# Patient Record
Sex: MALE | Race: WHITE
[De-identification: names, ages, dates, MRNs, and addresses within clinical notes are randomized per-mention and may not be internally consistent; named-entity substitution may affect disease eponyms.]

---

## 2022-02-20 ENCOUNTER — HOSPITAL ENCOUNTER (INPATIENT)
Dept: HOSPITAL 47 - EC | Age: 52
LOS: 4 days | Discharge: HOME | DRG: 158 | End: 2022-02-24
Attending: INTERNAL MEDICINE | Admitting: INTERNAL MEDICINE
Payer: COMMERCIAL

## 2022-02-20 VITALS — BODY MASS INDEX: 40 KG/M2

## 2022-02-20 DIAGNOSIS — R47.02: ICD-10-CM

## 2022-02-20 DIAGNOSIS — I10: ICD-10-CM

## 2022-02-20 DIAGNOSIS — D72.819: ICD-10-CM

## 2022-02-20 DIAGNOSIS — C02.9: ICD-10-CM

## 2022-02-20 DIAGNOSIS — T45.1X5A: ICD-10-CM
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
## 2022-02-20 DIAGNOSIS — E87.1: ICD-10-CM

## 2022-02-20 DIAGNOSIS — Z79.899: ICD-10-CM

## 2022-02-20 DIAGNOSIS — K11.7: ICD-10-CM

## 2022-02-20 DIAGNOSIS — E86.0: ICD-10-CM

## 2022-02-20 DIAGNOSIS — B37.0: ICD-10-CM

## 2022-02-20 DIAGNOSIS — K12.30: Primary | ICD-10-CM

## 2022-02-20 DIAGNOSIS — E87.6: ICD-10-CM

## 2022-02-20 DIAGNOSIS — C14.0: ICD-10-CM

## 2022-02-20 DIAGNOSIS — Z20.822: ICD-10-CM

## 2022-02-20 DIAGNOSIS — E78.5: ICD-10-CM

## 2022-02-20 LAB
ALBUMIN SERPL-MCNC: 4 G/DL (ref 3.5–5)
ALP SERPL-CCNC: 86 U/L (ref 38–126)
ALT SERPL-CCNC: 61 U/L (ref 4–49)
AMYLASE SERPL-CCNC: 79 U/L (ref 30–110)
ANION GAP SERPL CALC-SCNC: 8 MMOL/L
APTT BLD: 22.8 SEC (ref 22–30)
AST SERPL-CCNC: 34 U/L (ref 17–59)
BUN SERPL-SCNC: 23 MG/DL (ref 9–20)
CALCIUM SPEC-MCNC: 9.6 MG/DL (ref 8.4–10.2)
CELLS COUNTED: 100
CHLORIDE SERPL-SCNC: 96 MMOL/L (ref 98–107)
CO2 SERPL-SCNC: 29 MMOL/L (ref 22–30)
ERYTHROCYTE [DISTWIDTH] IN BLOOD BY AUTOMATED COUNT: 4.79 M/UL (ref 4.3–5.9)
ERYTHROCYTE [DISTWIDTH] IN BLOOD: 12.6 % (ref 11.5–15.5)
GLUCOSE SERPL-MCNC: 92 MG/DL (ref 74–99)
HCT VFR BLD AUTO: 41.8 % (ref 39–53)
HGB BLD-MCNC: 15.1 GM/DL (ref 13–17.5)
INR PPP: 1 (ref ?–1.2)
LIPASE SERPL-CCNC: 77 U/L (ref 23–300)
LYMPHOCYTES # BLD MANUAL: 0.79 K/UL (ref 1–4.8)
MCH RBC QN AUTO: 31.6 PG (ref 25–35)
MCHC RBC AUTO-ENTMCNC: 36.3 G/DL (ref 31–37)
MCV RBC AUTO: 87.3 FL (ref 80–100)
METAMYELOCYTES # BLD: 0.04 K/UL
MONOCYTES # BLD MANUAL: 0.43 K/UL (ref 0–1)
NEUTROPHILS NFR BLD MANUAL: 61 %
NEUTS SEG # BLD MANUAL: 2.3 K/UL (ref 1.3–7.7)
PLATELET # BLD AUTO: 284 K/UL (ref 150–450)
POTASSIUM SERPL-SCNC: 3.3 MMOL/L (ref 3.5–5.1)
PROT SERPL-MCNC: 7.1 G/DL (ref 6.3–8.2)
PT BLD: 10.8 SEC (ref 9–12)
SODIUM SERPL-SCNC: 133 MMOL/L (ref 137–145)
WBC # BLD AUTO: 3.6 K/UL (ref 3.8–10.6)

## 2022-02-20 PROCEDURE — 85730 THROMBOPLASTIN TIME PARTIAL: CPT

## 2022-02-20 PROCEDURE — 80048 BASIC METABOLIC PNL TOTAL CA: CPT

## 2022-02-20 PROCEDURE — 77386: CPT

## 2022-02-20 PROCEDURE — 80053 COMPREHEN METABOLIC PANEL: CPT

## 2022-02-20 PROCEDURE — 85610 PROTHROMBIN TIME: CPT

## 2022-02-20 PROCEDURE — 87635 SARS-COV-2 COVID-19 AMP PRB: CPT

## 2022-02-20 PROCEDURE — 74018 RADEX ABDOMEN 1 VIEW: CPT

## 2022-02-20 PROCEDURE — 74230 X-RAY XM SWLNG FUNCJ C+: CPT

## 2022-02-20 PROCEDURE — 96361 HYDRATE IV INFUSION ADD-ON: CPT

## 2022-02-20 PROCEDURE — 36415 COLL VENOUS BLD VENIPUNCTURE: CPT

## 2022-02-20 PROCEDURE — 99285 EMERGENCY DEPT VISIT HI MDM: CPT

## 2022-02-20 PROCEDURE — 71046 X-RAY EXAM CHEST 2 VIEWS: CPT

## 2022-02-20 PROCEDURE — 82150 ASSAY OF AMYLASE: CPT

## 2022-02-20 PROCEDURE — 85025 COMPLETE CBC W/AUTO DIFF WBC: CPT

## 2022-02-20 PROCEDURE — 70360 X-RAY EXAM OF NECK: CPT

## 2022-02-20 PROCEDURE — 83690 ASSAY OF LIPASE: CPT

## 2022-02-20 PROCEDURE — 96374 THER/PROPH/DIAG INJ IV PUSH: CPT

## 2022-02-20 RX ADMIN — MORPHINE SULFATE PRN MG: 4 INJECTION, SOLUTION INTRAMUSCULAR; INTRAVENOUS at 22:39

## 2022-02-20 RX ADMIN — CEFAZOLIN SCH: 330 INJECTION, POWDER, FOR SOLUTION INTRAMUSCULAR; INTRAVENOUS at 19:16

## 2022-02-20 NOTE — ED
General Adult HPI





- General


Chief complaint: Nausea/Vomiting/Diarrhea


Stated complaint: Dehydration, vomiting


Time Seen by Provider: 02/20/22 17:10


Source: patient, RN/MD (Case was discussed with Dr. Mcdonald who would like patient 

admitted.  He will consult.), RN notes reviewed


Mode of arrival: ambulatory


Limitations: no limitations





- History of Present Illness


Initial comments: 





Patient is a pleasant 51-year-old male presenting to the emergency department 

with dysphagia.  Patient had symptoms last week.  Patient does have history of 

tongue and throat cancer.  Patient currently is on radiation and chemotherapy.  

Symptoms have progressively worsened over the past 4-5 days.  Patient has 

limited ability to tolerate liquids.  No dyspnea.  Patient has been clear his 

throat frequently.  No abdominal pain.  No fevers.





- Related Data


                                    Allergies











Allergy/AdvReac Type Severity Reaction Status Date / Time


 


No Known Allergies Allergy   Verified 02/20/22 17:07














Review of Systems


ROS Statement: 


Those systems with pertinent positive or pertinent negative responses have been 

documented in the HPI.





ROS Other: All systems not noted in ROS Statement are negative.


Constitutional: Denies: fever


Eyes: Denies: eye pain


ENT: Reports: as per HPI


Respiratory: Reports: cough (Occasional cough).  Denies: dyspnea


Cardiovascular: Denies: chest pain


Endocrine: Denies: fatigue


Gastrointestinal: Denies: abdominal pain


Genitourinary: Denies: dysuria


Musculoskeletal: Denies: back pain


Skin: Denies: rash


Neurological: Denies: weakness





Past Medical History


Additional Past Medical History / Comment(s): chemo- 2/14/2022.  radiation- 

2-.  cx of throat and tounge stage 2.


History of Any Multi-Drug Resistant Organisms: None Reported


Additional Past Surgical History / Comment(s): Arm surgery


Past Psychological History: No Psychological Hx Reported


Smoking Status: Never smoker


Past Alcohol Use History: Unable to Obtain


Past Drug Use History: None Reported





General Exam


Limitations: no limitations


General appearance: alert, in no apparent distress


Head exam: Present: normocephalic


Eye exam: Present: normal appearance, PERRL


ENT exam: Present: normal oropharynx


Neck exam: Present: normal inspection


Respiratory exam: Present: normal lung sounds bilaterally


Cardiovascular Exam: Present: regular rate, normal rhythm


GI/Abdominal exam: Present: soft.  Absent: tenderness


Extremities exam: Present: normal inspection.  Absent: pedal edema, calf 

tenderness


Neurological exam: Present: alert


Psychiatric exam: Present: normal affect, normal mood


Skin exam: Present: normal color





Course


                                   Vital Signs











  02/20/22





  17:04


 


Temperature 96.8 F L


 


Pulse Rate 61


 


Respiratory 18





Rate 


 


Blood Pressure 123/75


 


O2 Sat by Pulse 97





Oximetry 














Medical Decision Making





- Medical Decision Making





Patient is made aware of plan.  Case was discussed with Dr. Alvarado, who will admit

covering hospital call.





Disposition


Clinical Impression: 


 Dehydration, Dysphagia





Disposition: ADMITTED AS IP TO THIS HOSP


Is patient prescribed a controlled substance at d/c from ED?: No


Referrals: 


Lakesha Diana DO [Primary Care Provider] - 1-2 days


Decision Time: 18:35

## 2022-02-20 NOTE — XR
EXAMINATION TYPE: XR KUB

 

DATE OF EXAM: 2/20/2022

 

COMPARISON: NONE

 

HISTORY: Dysphagia

 

TECHNIQUE: 2 views upright

 

FINDINGS: Bowel gas pattern is normal. There is no sign of intestinal obstruction or pneumoperitoneum
. Fecal pattern is normal. There are no pathologic calcifications. Bony structures are intact.

 

IMPRESSION: Nonacute abdomen.

## 2022-02-20 NOTE — XR
EXAMINATION TYPE: XR soft tissue neck

 

DATE OF EXAM: 2/20/2022

 

COMPARISON: NONE

 

HISTORY: Dysphagia

 

TECHNIQUE: 2 views

 

FINDINGS: Epiglottis is normal. Subglottic trachea appears normal. Prevertebral soft tissues are not 
thickened. Cervical spine appears intact. Tonsils and adenoids appear within normal limits.

 

IMPRESSION: Negative cervical soft tissue exam

## 2022-02-20 NOTE — XR
EXAMINATION TYPE: XR chest 2V

 

DATE OF EXAM: 2/20/2022

 

COMPARISON: NONE

 

HISTORY: Dysphagia

 

TECHNIQUE: 2 view

 

FINDINGS: Heart and mediastinum are normal. Lungs are clear of infiltrate. There is no heart failure.
 There is old healed fracture left clavicle. There is no pleural effusion. Bony thorax is intact.

 

IMPRESSION: No active cardiopulmonary disease. Normal heart.

## 2022-02-21 LAB
ANION GAP SERPL CALC-SCNC: 12.7 MMOL/L (ref 10–18)
BUN SERPL-SCNC: 18.6 MG/DL (ref 9–27)
BUN/CREAT SERPL: 15.5 RATIO (ref 12–20)
CALCIUM SPEC-MCNC: 9.3 MG/DL (ref 8.7–10.3)
CHLORIDE SERPL-SCNC: 96 MMOL/L (ref 96–109)
CO2 SERPL-SCNC: 29.3 MMOL/L (ref 20–27.5)
GLUCOSE SERPL-MCNC: 85 MG/DL (ref 70–110)
POTASSIUM SERPL-SCNC: 3.6 MMOL/L (ref 3.5–5.5)
SODIUM SERPL-SCNC: 138 MMOL/L (ref 135–145)

## 2022-02-21 RX ADMIN — ENOXAPARIN SODIUM SCH MG: 40 INJECTION SUBCUTANEOUS at 09:04

## 2022-02-21 RX ADMIN — CEFAZOLIN SCH MLS/HR: 330 INJECTION, POWDER, FOR SOLUTION INTRAMUSCULAR; INTRAVENOUS at 04:04

## 2022-02-21 RX ADMIN — ONDANSETRON PRN MG: 2 INJECTION INTRAMUSCULAR; INTRAVENOUS at 17:51

## 2022-02-21 RX ADMIN — ACETAMINOPHEN SCH ML: 160 SOLUTION ORAL at 21:22

## 2022-02-21 RX ADMIN — MORPHINE SULFATE PRN MG: 4 INJECTION, SOLUTION INTRAMUSCULAR; INTRAVENOUS at 04:04

## 2022-02-21 RX ADMIN — PANTOPRAZOLE SODIUM SCH MG: 40 TABLET, DELAYED RELEASE ORAL at 09:03

## 2022-02-21 RX ADMIN — ACETAMINOPHEN SCH ML: 160 SOLUTION ORAL at 16:43

## 2022-02-21 RX ADMIN — CEFAZOLIN SCH MLS/HR: 330 INJECTION, POWDER, FOR SOLUTION INTRAMUSCULAR; INTRAVENOUS at 13:21

## 2022-02-21 RX ADMIN — ONDANSETRON PRN MG: 2 INJECTION INTRAMUSCULAR; INTRAVENOUS at 13:21

## 2022-02-21 RX ADMIN — CEFAZOLIN SCH MLS/HR: 330 INJECTION, POWDER, FOR SOLUTION INTRAMUSCULAR; INTRAVENOUS at 21:21

## 2022-02-21 RX ADMIN — ACETAMINOPHEN SCH ML: 160 SOLUTION ORAL at 13:21

## 2022-02-21 RX ADMIN — ATORVASTATIN CALCIUM SCH MG: 20 TABLET, FILM COATED ORAL at 21:20

## 2022-02-21 NOTE — P.PN
Subjective


Progress Note Date: 02/21/22


Principal diagnosis: 


sore throat/nausea


Patient reports his throat has been increasingly sore of the past few days.  

Mostly when talking/swallowing.  Seems to get some good relief from magic mouth 

wash, but this wears off.  He also has been quite nauseous.  He threw up 2x 

earlier today, but has since kept his lunch down.  He is mostly drinking water 

and eating jello.  Also complains of thickened secretions.








Objective





- Vital Signs


Vital signs: 


                                   Vital Signs











Temp  98 F   02/21/22 13:00


 


Pulse  59 L  02/21/22 13:00


 


Resp  18   02/21/22 13:00


 


BP  155/84   02/21/22 13:00


 


Pulse Ox  97   02/21/22 13:00








                                 Intake & Output











 02/20/22 02/21/22 02/21/22





 18:59 06:59 18:59


 


Intake Total  1420 


 


Balance  1420 


 


Weight 133.81 kg 133.81 kg 133.81 kg


 


Intake:   


 


  Intake, IV Titration  1300 





  Amount   


 


    Sodium Chloride 0.9% 1,  1300 





    000 ml @ 130 mls/hr IV .   





    Q7H42M [facility] Rx#:[id number]   


 


  Oral  120 


 


Other:   


 


  # Voids  1 














- Constitutional


General appearance: Present: no acute distress





- EENT


Eyes: Present: EOMI, PERRLA


ENT: Present: normal oropharynx





- Neck


Neck: Present: lymphadenopathy





- Respiratory


Respiratory: bilateral: CTA





- Cardiovascular


Rhythm: regular





- Integumentary


Integumentary: Absent: calor





- Neurologic


Neurologic: Present: CNII-XII intact





- Psychiatric


Psychiatric: Present: A&O x's 3, appropriate affect





- Labs


CBC & Chem 7: 


                                 02/20/22 18:19





                                 02/21/22 05:15


Labs: 


                  Abnormal Lab Results - Last 24 Hours (Table)











  02/20/22 02/20/22 02/21/22 Range/Units





  18:19 18:19 05:15 


 


WBC  3.6 L    (3.8-10.6)  k/uL


 


Lymphocytes # (Manual)  0.79 L    (1.0-4.8)  k/uL


 


Metamyelocytes # (Man)  0.04 H    (0)  k/uL


 


Sodium   133 L   (137-145)  mmol/L


 


Potassium   3.3 L   (3.5-5.1)  mmol/L


 


Chloride   96 L   ()  mmol/L


 


Carbon Dioxide    29.3 H  (20.0-27.5)  mmol/L


 


BUN   23 H   (9-20)  mg/dL


 


ALT   61 H   (4-49)  U/L














Assessment and Plan


Assessment: 


The patient is a 51-year-old male with a history of a recently diagnosed 

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  He has been enrolled on [facility] HN-005 and randomized to Arm

2 with cisplatin + 6 weeks RT.  He has completed 4/6 weeks of radiation.  

Hospitalized due to dehydration and difficulty with PO intake due to nausea and 

mucositis.





Plan: 


1.  Poor PO intake:  Due to chemoradiation.  Patient has had discussion RE PEG 

tube - he hopes to avoid, but currently only tolerating liquids/jello.  May be 

partially due to increased nausea/vomiting from chemo the week prior.  Will re-

assess tomorrow.   Continue Magic mouth wash + narcotic pain medication.  





2.  SCCa of oropharynx:  Hold XRT today - will re-evaluate tomorrow.  10 

treatments remaining.  Did explain that even after completion of therapy that 

symptoms of treatment would linger for 2-3 weeks.  


Time with Patient: Less than 30

## 2022-02-21 NOTE — P.HPIM
History of Present Illness


H&P Date: 02/20/22


Chief Complaint: dysphagia 





51 year old male with recent diagnosis of throat cancer





patient comes in with 1 week history of worsening dysphagia to both solid and 

liquid , symptoms got worse over the past 4-5days , patient unable to take PO 

due to dysphagia and odynophagia 





he was recently diagnosed with throat cancer, and currently receiving radiation 

therapy  for the past 4 weeks. 





denies any fever, chills, bleeding from his mouth, denies any URI symptoms , 

denies headache, or dental issues. denies any history of GERD. 





he denies any similar symptoms in the past. 





workup in the ED showed, hypokalemia and COVID negative, CXR no acute pathology 





patient denies tobacco smoking, and admits to social alcohol intake , denies any

thing daily or excessive, denies any drugs





Review of Systems





 











Pertinent positives as noted in HPI. All other systems were reviewed and are 

negative 





Past Medical History


Additional Past Medical History / Comment(s): chemo- 2/14/2022.  radiation- 

2-.  cx of throat and tounge stage 2.


History of Any Multi-Drug Resistant Organisms: None Reported


Additional Past Surgical History / Comment(s): Arm surgery


Past Psychological History: No Psychological Hx Reported


Smoking Status: Never smoker


Past Alcohol Use History: Unable to Obtain


Past Drug Use History: None Reported





- Past Family History


  ** famioy 


Family Medical History: No Reported History





Medications and Allergies


                                Home Medications











 Medication  Instructions  Recorded  Confirmed  Type


 


Atorvastatin [Lipitor] 20 mg PO HS 02/20/22 02/20/22 History


 


Hydrocodone/Acetaminophen 15 ml PO Q8H PRN 02/20/22 02/20/22 History





[Hydrocodone/Acetaminophen    





7.5-325/15 Ml]    


 


Omeprazole 40 mg PO DAILY 02/20/22 02/20/22 History


 


hydroCHLOROthiazide 25 mg PO DAILY 02/20/22 02/20/22 History








                                    Allergies











Allergy/AdvReac Type Severity Reaction Status Date / Time


 


No Known Allergies Allergy   Verified 02/20/22 19:41














Physical Exam


Vitals: 


                                   Vital Signs











  Temp Pulse Pulse Resp BP BP Pulse Ox


 


 02/20/22 22:14  98.5 F   71  16   136/76  95


 


 02/20/22 17:04  96.8 F L  61   18  123/75   97








                                Intake and Output











 02/20/22 02/20/22 02/21/22





 14:59 22:59 06:59


 


Other:   


 


  Weight  133.81 kg 














Constitutional:          No acute distress, conversant, pleasant


Eyes:      Anicteric sclerae, moist conjunctiva, 


         Pupils equal round reactive to light





ENMT:      NC/AT


         Oropharynx showing sloughing and yellowish exudate over soft palate and

oropharynx with erythema





Neck:      Supple,  palpable LN submandibular and anterior cervical over left 

side, or JVD


         No carotid bruits


         No thyromegaly





Lungs:      Clear to auscultation


         Clear to percussion


         Normal respiratory effort, no accessory muscle use 





Cardiovascular:      Heart regular in rate and rhythm, 


         No murmurs, gallops, or rubs


         No peripheral edema





Abdominal:       Soft


         Nontender, no guarding, rebound or rigidity


         Abdomen moving with respiration


         Normoactive bowel sounds


         No hepatomegaly, No splenomegaly


         No palpable mass 


         No abdominal wall hernia noted 





Skin:      Normal temperature, tone, texture, turgor


         No induration


         No subcutaneous nodules 


         No rash, lesions


         No ulcers





Extremities:      No digital cyanosis 


         No clubbing


         Pedal pulses intact and symmetrical


         Radial pulses intact and symmetrical 


         No calf tenderness 





Psychiatric:      Alert and oriented to person, place and time


         Appropriate affect


         fair judgement   


      


Neuro      Muscles Strength 5/5 in all 4 extremities 


         Sensation to light touch grossly present throughout


         Cranial nerves II-XII grossly intact


         No focal sensory deficits


Lymphatics:       palpable left submandibular and anterior cervical  lymph nodes

 





Results


CBC & Chem 7: 


                                 02/20/22 18:19





                                 02/20/22 18:19


Labs: 


                  Abnormal Lab Results - Last 24 Hours (Table)











  02/20/22 02/20/22 Range/Units





  18:19 18:19 


 


WBC  3.6 L   (3.8-10.6)  k/uL


 


Lymphocytes # (Manual)  0.79 L   (1.0-4.8)  k/uL


 


Metamyelocytes # (Man)  0.04 H   (0)  k/uL


 


Sodium   133 L  (137-145)  mmol/L


 


Potassium   3.3 L  (3.5-5.1)  mmol/L


 


Chloride   96 L  ()  mmol/L


 


BUN   23 H  (9-20)  mg/dL


 


ALT   61 H  (4-49)  U/L














Assessment and Plan


Assessment: 





acute mucositis of the oropharynx  (Radiation induced oral mucositis)


throat cancer





plan 


artificial saliva


mouth wash


nystatin 


supportive care


pain control with opiates


oncology consult 


IVF hydration with normal saline 





hypokalemia , replace and follow up levels 





full code


lovenox for dvt ppx


anticipated length of stay < 2 midnis

## 2022-02-21 NOTE — P.PN
<Gunner Delgadillo - Last Filed: 02/21/22 15:37>





Subjective


Progress Note Date: 02/21/22





Hospital course:





Patient is a very pleasant 51-year-old male recently diagnosed with throat 

cancer (squamous cell carcinoma ) and began chemotherapy and radiation 4 weeks 

ago.  Patient reports follows with oncologist Dr. Patel in office and Dr. Soto for radiation/chemotherapy treatments.  He presented to the emergency 

department 2/20/22 with a chief complaint of worsening dysphasia patient reports

this began approximately 1 weeks ago after undergoing his second course of 

chemotherapy on 2/14/22 and has progressively worsened.  Patient reports last 

radiation treatment on 2/20/22 and next dose due today.  In the emergency 

department, patient underwent x-ray soft tissue of neck which was negative for 

cervical soft tissue abnormalities.  Chest x-ray was negative for acute 

cardiopulmonary process   An KUB was also negative for acute process.  Patient 

was found to have leukopenia with WBC count of 3.6, hyponatremia with sodium of 

133, and hypokalemia with potassium of 3.3.  Labs otherwise showing no sig

nificant abnormalities.  Covid PCR was negative.





Physical exam:





Vital signs reviewed and stable. 


General: Nontoxic, no distress and appears stated age.  


Derm: Skin warm and dry, normal coloration for ethnicity.


Head: Atraumatic, normocephalic and symmetric.  


Eyes: EOMs intact, no lid lag, and anicteric sclera


Mouth: no lip lesions, mucus membranes dry


Cardiovascular: regular rate and rhythm with normal S1S2, no murmur, positive 

posterior tibial pulses bilaterally, and cap refill < 2 seconds.  


Lungs: Respirations even, regular, and unlabored on room air. Lungs CTA 

bilaterally, no rhonchi, no rales, no wheezing, and no accessory muscle usage. 


Abdominal: soft, nontender to palpation, no guarding, no appreciable 

organomegaly


Ext: ROM intact. No gross muscle atrophy, no edema, no contractures


Neuro: Speech clear, face symmetrical and CN II-XII grossly intact with no noted

focal neuro deficits


Psych: Alert and oriented to person, place, time, and situation. Appropriate and

pleasant affect. 





Assessment and Plan of Care:





Acute mucositis, radiation-induced oral mucositis


Squamous cell carcinoma of the throat


Dysphasia


Xerostomia


-Continue with artificial saliva


-Magic mouthwash


-Nystatin


-Symptomatic and supportive measures


-Pain control with opiates


-Oncology consult


-Continue hydration with IV fluids.





Leukopenia, secondary to current anti-neoplastic medications/treatment


-We will provide neutropenic precautions for patient safety and continue to 

monitor with repeat a.m. labs.





Hypokalemia, resolved





Hyponatremia, resolved











CODE STATUS: Full code


DVT prophylaxis: Lovenox


Discussed with: Patient and RN


Anticipated discharge date: Clinical course to determine


Anticipated discharge place: Home


A total of 35 minutes was spent on the care of this complex patient more than 

50% of the time was spent in counseling and care coordination.











Objective





- Vital Signs


Vital signs: 


                                   Vital Signs











Temp  97.7 F   02/21/22 04:32


 


Pulse  87   02/21/22 04:32


 


Resp  16   02/21/22 04:32


 


BP  120/70   02/21/22 04:32


 


Pulse Ox  97   02/21/22 04:32








                                 Intake & Output











 02/20/22 02/21/22 02/21/22





 18:59 06:59 18:59


 


Intake Total  1420 


 


Balance  1420 


 


Weight 133.81 kg 133.81 kg 


 


Intake:   


 


  Intake, IV Titration  1300 





  Amount   


 


    Sodium Chloride 0.9% 1,  1300 





    000 ml @ 130 mls/hr IV .   





    Q7H42M [facility] Rx#:[id number]   


 


  Oral  120 


 


Other:   


 


  # Voids  1 














- Labs


CBC & Chem 7: 


                                 02/20/22 18:19





                                 02/21/22 05:15


Labs: 


                  Abnormal Lab Results - Last 24 Hours (Table)











  02/20/22 02/20/22 Range/Units





  18:19 18:19 


 


WBC  3.6 L   (3.8-10.6)  k/uL


 


Lymphocytes # (Manual)  0.79 L   (1.0-4.8)  k/uL


 


Metamyelocytes # (Man)  0.04 H   (0)  k/uL


 


Sodium   133 L  (137-145)  mmol/L


 


Potassium   3.3 L  (3.5-5.1)  mmol/L


 


Chloride   96 L  ()  mmol/L


 


BUN   23 H  (9-20)  mg/dL


 


ALT   61 H  (4-49)  U/L














<Suma Padgett - Last Filed: 02/21/22 16:32>





Subjective





I reviewed the documentation as provided by the RAFAEL above, who is the original 

author of this note.  I agree with the documented assessment and plan, with the 

following changes: None





Objective





- Vital Signs


Vital signs: 


                                   Vital Signs











Temp  98 F   02/21/22 13:00


 


Pulse  59 L  02/21/22 13:00


 


Resp  18   02/21/22 13:00


 


BP  155/84   02/21/22 13:00


 


Pulse Ox  97   02/21/22 13:00








                                 Intake & Output











 02/20/22 02/21/22 02/21/22





 18:59 06:59 18:59


 


Intake Total  1420 


 


Balance  1420 


 


Weight 133.81 kg 133.81 kg 133.81 kg


 


Intake:   


 


  Intake, IV Titration  1300 





  Amount   


 


    Sodium Chloride 0.9% 1,  1300 





    000 ml @ 130 mls/hr IV .   





    Q7H42M [facility] Rx#:[id number]   


 


  Oral  120 


 


Other:   


 


  # Voids  1 














- Labs


CBC & Chem 7: 


                                 02/20/22 18:19





                                 02/21/22 05:15


Labs: 


                  Abnormal Lab Results - Last 24 Hours (Table)











  02/20/22 02/20/22 02/21/22 Range/Units





  18:19 18:19 05:15 


 


WBC  3.6 L    (3.8-10.6)  k/uL


 


Lymphocytes # (Manual)  0.79 L    (1.0-4.8)  k/uL


 


Metamyelocytes # (Man)  0.04 H    (0)  k/uL


 


Sodium   133 L   (137-145)  mmol/L


 


Potassium   3.3 L   (3.5-5.1)  mmol/L


 


Chloride   96 L   ()  mmol/L


 


Carbon Dioxide    29.3 H  (20.0-27.5)  mmol/L


 


BUN   23 H   (9-20)  mg/dL


 


ALT   61 H   (4-49)  U/L

## 2022-02-21 NOTE — P.CONS
History of Present Illness





- Reason for Consult


Consult date: 02/21/22


completed treatment for head/neck


Requesting physician: Adalberto Ware





- Chief Complaint


dyphagia





- History of Present Illness





Mr. Menjivar is a very pleasant 51-year-old male patient of Dr. Patel who 

presented with a painless left mid cervical lesion persistent for about 2-3 

months, progressively enlarging.  He had a CT scan done showing 2 enlarged 

cervical lymph nodes, the largest was 3.3 cm.  FNA 10/12/21 was positive for 

squamous cell carcinoma, P 16 positive.  Staging PET scan 11/9/21 showed uptake 

in the 2 known cervical lymph nodes and left base of the tongue, which was felt 

to represent the primary tumor site.  No other metastatic lesions, no 

contralateral lymphadenopathy.  Patient was evaluated by Dr. Villalobos, 

recommendation was for neoadjuvant concurrent chemoradiation.  Patient is status

post 2 cycles of cisplatin, 2/14/22.  He has 2 more weeks of radiation to 

complete.  He has had mild symptoms up until this past weekend.


His wife contacted the service, patient has been having difficulty maintaining 

oral intake secondary to dysphagia and odynophagia, he has also been 

experiencing nausea and vomiting, moderate dry mucous membranes.  On admission h

e was found to have hyponatremia, hypokalemia.  He did have an episode of 

vomiting this a.m.  No recent fevers, new or unusual cough, suspicions for 

aspiration, chest pain, abdominal pain or cramping, acute changes in bowel or 

bladder habits.  The pain he is experiencing is limited to the oral cavity and 

swallowing





Review of Systems





10 point ROS is neg except as stated in HPI





Past Medical History


Past Medical History: Cancer, Hyperlipidemia, Hypertension


Additional Past Medical History / Comment(s): chemo- 2/14/2022.  radiation- 

2-.  cx of throat and tounge stage 2.


History of Any Multi-Drug Resistant Organisms: None Reported


Additional Past Surgical History / Comment(s): Arm surgery, has had colonoscopy


Additional Past Anesthesia/Blood Transfusion Reaction / Comm: No hx of 

transfusion at this time


Past Psychological History: No Psychological Hx Reported


Smoking Status: Never smoker


Past Alcohol Use History: Unable to Obtain


Past Drug Use History: None Reported





- Past Family History


  ** famioy 


Family Medical History: No Reported History





Medications and Allergies


                                Home Medications











 Medication  Instructions  Recorded  Confirmed  Type


 


Atorvastatin [Lipitor] 20 mg PO HS 02/20/22 02/20/22 History


 


Hydrocodone/Acetaminophen 15 ml PO Q8H PRN 02/20/22 02/20/22 History





[Hydrocodone/Acetaminophen    





7.5-325/15 Ml]    


 


Omeprazole 40 mg PO DAILY 02/20/22 02/20/22 History


 


hydroCHLOROthiazide 25 mg PO DAILY 02/20/22 02/20/22 History








                                    Allergies











Allergy/AdvReac Type Severity Reaction Status Date / Time


 


No Known Allergies Allergy   Verified 02/20/22 19:41














Physical Exam


Vitals: 


                                   Vital Signs











  Temp Pulse Pulse Resp BP BP Pulse Ox


 


 02/21/22 04:32  97.7 F   87  16   120/70  97


 


 02/20/22 22:14  98.5 F   71  16   136/76  95


 


 02/20/22 17:04  96.8 F L  61   18  123/75   97








                                Intake and Output











 02/20/22 02/21/22 02/21/22





 22:59 06:59 14:59


 


Intake Total  1420 


 


Balance  1420 


 


Intake:   


 


  Intake, IV Titration  1300 





  Amount   


 


    Sodium Chloride 0.9% 1,  1300 





    000 ml @ 130 mls/hr IV .   





    Q7H42M [facility] Rx#:[id number]   


 


  Oral  120 


 


Other:   


 


  # Voids  1 


 


  Weight 133.81 kg  














- Constitutional


General appearance: average body habitus, cooperative, no acute distress





- EENT





dry mucus membranes


Eyes: anicteric sclerae, EOMI


ENT: hearing grossly normal





- Neck


Neck: no lymphadenopathy





- Respiratory


Respiratory: bilateral: CTA





- Cardiovascular


Rhythm: regular


Heart sounds: normal: S1, S2


Abnormal Heart Sounds: no systolic murmur, no diastolic murmur, no rub, no S3 

Gallop, no S4 Gallop, no click, no other


  ** leg


Peripheral Edema: bilateral: None





- Gastrointestinal


General gastrointestinal: no absent bowel sounds, no decreased bowel sounds, no 

distended, no hepatomegaly, no hyperactive bowel sounds, normal bowel sounds, no

organomegaly, no rigid, no scaphoid, soft, no splenomegaly, no tenderness, no 

umbilical hernia, no ventral hernia





- Integumentary


Integumentary: normal





- Neurologic


Neurologic: CNII-XII intact





- Musculoskeletal


Musculoskeletal: strength equal bilaterally





- Psychiatric


Psychiatric: A&O x's 3, appropriate affect, intact judgment & insight





Results


CBC & Chem 7: 


                                 02/20/22 18:19





                                 02/21/22 05:15


Labs: 


                  Abnormal Lab Results - Last 24 Hours (Table)











  02/20/22 02/20/22 Range/Units





  18:19 18:19 


 


WBC  3.6 L   (3.8-10.6)  k/uL


 


Lymphocytes # (Manual)  0.79 L   (1.0-4.8)  k/uL


 


Metamyelocytes # (Man)  0.04 H   (0)  k/uL


 


Sodium   133 L  (137-145)  mmol/L


 


Potassium   3.3 L  (3.5-5.1)  mmol/L


 


Chloride   96 L  ()  mmol/L


 


BUN   23 H  (9-20)  mg/dL


 


ALT   61 H  (4-49)  U/L











Comments: 





Xray neck report reviewed


Chest x-ray: report reviewed


Abdominal x-ray: report reviewed





Assessment and Plan


(1) Dehydration


Narrative/Plan: 


IV fluids and electrolyte replacements


Current Visit: Yes   Status: Acute   Priority: High   Code(s): E86.0 - 

DEHYDRATION   SNOMED Code(s): 45956197


   





(2) Dysphagia


Narrative/Plan: 


Kools solution with nystatin ordered 5xDay


Dietitian to assess pt and provide options for dysphagia/odynophagia


Current Visit: Yes   Status: Acute   Priority: High   Code(s): R13.10 - 

DYSPHAGIA, UNSPECIFIED   SNOMED Code(s): 19048256


   





(3) Squamous cell cancer of tongue


Narrative/Plan: 


Patient has completed 2 cycles of cisplatin, last dose was 7 days ago.  Vomiting

and electrolyte derangements are likely secondary to the same.  Supportive 

medications, hydration are all ordered.





Discussed the case with Radiation Oncologist.  Plan is to hold treatment for 

today.  Radiation Oncologist will see patient and determine when resumption of 

therapy is most appropriate.





Dr. Mcdonald did discuss with the patient the possibility of palliative PEG tube for 

nutrition and hydration until completion of treatment.  For the next 1-2 days 

would like to try to treat the side effects of chemotherapy and ease symptoms.  

Have also asked the dietitian to see the patient to educate them and provide 

accommodations for clear and full liquid high calorie/protein options.  Will 

reassess the pt and decide with patient if he thinks he will be able to manage 

for 2 more weeks of radiation as well as an additional 2 weeks of recovery time-

total 4 weeks.  Will f/u.   


Current Visit: Yes   Status: Acute   Priority: High   Code(s): C02.9 - MALIGNANT

NEOPLASM OF TONGUE, UNSPECIFIED   SNOMED Code(s): 008029286


   





(4) Vomiting


Narrative/Plan: 


Olanzapine and zofran ordered.  


Current Visit: Yes   Status: Acute   Priority: High   Code(s): R11.10 - 

VOMITING, UNSPECIFIED   SNOMED Code(s): 859439005


   


Plan: 





Doctor attests:


I performed a history and physical examination of this patient, developed 

impression and plan of care, discussed with dictator.  I agree with dictators 

note, documented as a scribe.

## 2022-02-22 RX ADMIN — ACETAMINOPHEN SCH ML: 160 SOLUTION ORAL at 19:57

## 2022-02-22 RX ADMIN — ENOXAPARIN SODIUM SCH MG: 40 INJECTION SUBCUTANEOUS at 11:52

## 2022-02-22 RX ADMIN — ONDANSETRON PRN MG: 2 INJECTION INTRAMUSCULAR; INTRAVENOUS at 11:01

## 2022-02-22 RX ADMIN — ACETAMINOPHEN SCH: 160 SOLUTION ORAL at 15:52

## 2022-02-22 RX ADMIN — CEFAZOLIN SCH MLS/HR: 330 INJECTION, POWDER, FOR SOLUTION INTRAMUSCULAR; INTRAVENOUS at 19:56

## 2022-02-22 RX ADMIN — ACETAMINOPHEN SCH: 160 SOLUTION ORAL at 00:22

## 2022-02-22 RX ADMIN — CEFAZOLIN SCH MLS/HR: 330 INJECTION, POWDER, FOR SOLUTION INTRAMUSCULAR; INTRAVENOUS at 23:52

## 2022-02-22 RX ADMIN — PANTOPRAZOLE SODIUM SCH MG: 40 TABLET, DELAYED RELEASE ORAL at 11:53

## 2022-02-22 RX ADMIN — CEFAZOLIN SCH: 330 INJECTION, POWDER, FOR SOLUTION INTRAMUSCULAR; INTRAVENOUS at 20:00

## 2022-02-22 RX ADMIN — ACETAMINOPHEN SCH ML: 160 SOLUTION ORAL at 23:52

## 2022-02-22 RX ADMIN — CEFAZOLIN SCH: 330 INJECTION, POWDER, FOR SOLUTION INTRAMUSCULAR; INTRAVENOUS at 06:20

## 2022-02-22 RX ADMIN — ACETAMINOPHEN SCH: 160 SOLUTION ORAL at 06:20

## 2022-02-22 RX ADMIN — ACETAMINOPHEN SCH ML: 160 SOLUTION ORAL at 11:01

## 2022-02-22 RX ADMIN — ATORVASTATIN CALCIUM SCH MG: 20 TABLET, FILM COATED ORAL at 19:56

## 2022-02-22 NOTE — P.PN
<Gunner Delgadillo - Last Filed: 02/22/22 15:30>





Subjective


Progress Note Date: 02/22/22





Hospital course:





Patient is a very pleasant 51-year-old male recently diagnosed with throat can

cer (squamous cell carcinoma ) and began chemotherapy and radiation 4 weeks ago.

 Patient reports follows with oncologist Dr. Patel in office and Dr. Soto 

for radiation/chemotherapy treatments.  He presented to the emergency department

2/20/22 with a chief complaint of worsening dysphasia patient reports this began

approximately 1 weeks ago after undergoing his second course of chemotherapy on 

2/14/22 and has progressively worsened.  Patient reports last radiation 

treatment on 2/20/22 and next dose due today.  In the emergency department, 

patient underwent x-ray soft tissue of neck which was negative for cervical soft

tissue abnormalities.  Chest x-ray was negative for acute cardiopulmonary 

process   An KUB was also negative for acute process.  Patient was found to have

leukopenia with WBC count of 3.6, hyponatremia with sodium of 133, and 

hypokalemia with potassium of 3.3.  Labs otherwise showing no significant 

abnormalities.  Covid PCR was negative.





Physical exam:





Patient seen and fully evaluated at the bedside this morning.  Patient going 

down for barium swallow evaluation.  He reports continued difficulty swallowing 

and continued dry mouth.  He denies having any chest pain, palpitations, 

shortness of breath, or experiencing any numbness/tingling/weakness in his 

extremities.  Dr. Soto has arranged for patient to continue radiation therapy

this afternoon.





Vital signs reviewed and stable. 


General: Nontoxic, no distress and appears stated age.  


Derm: Skin warm and dry, normal coloration for ethnicity.


Head: Atraumatic, normocephalic and symmetric.  


Eyes: EOMs intact, no lid lag, and anicteric sclera


Mouth: no lip lesions, mucus membranes dry


Cardiovascular: regular rate and rhythm with normal S1S2, no murmur, positive 

posterior tibial pulses bilaterally, and cap refill < 2 seconds.  


Lungs: Respirations even, regular, and unlabored on room air. Lungs CTA 

bilaterally, no rhonchi, no rales, no wheezing, and no accessory muscle usage. 


Abdominal: soft, nontender to palpation, no guarding, no appreciable organom

egaly


Ext: ROM intact. No gross muscle atrophy, no edema, no contractures


Neuro: Speech clear, face symmetrical and CN II-XII grossly intact with no noted

focal neuro deficits


Psych: Alert and oriented to person, place, time, and situation. Appropriate and

pleasant affect. 





Assessment and Plan of Care:





Acute mucositis, radiation-induced oral mucositis


Squamous cell carcinoma of the throat


Dysphasia


Xerostomia


-Continue with artificial saliva


-Magic mouthwash


-Nystatin


-Symptomatic and supportive measures


-Pain control with opiates


-Oncology following appreciate further recommendations


-Radiation oncologist following, Dr. Soto and he has arranged for continued 

radiation treatments.


-Continue hydration with IV fluids.





Leukopenia, secondary to current anti-neoplastic medications/treatment


-We will provide neutropenic precautions for patient safety and continue to 

monitor with repeat a.m. labs.





Hypokalemia, resolved





Hyponatremia, resolved








CODE STATUS: Full code


DVT prophylaxis: Lovenox


Discussed with: Patient and RN


Anticipated discharge date: Clinical course to determine


Anticipated discharge place: Home


A total of 35 minutes was spent on the care of this complex patient more than 

50% of the time was spent in counseling and care coordination.











Objective





- Vital Signs


Vital signs: 


                                   Vital Signs











Temp  98.1 F   02/22/22 04:53


 


Pulse  106 H  02/22/22 04:53


 


Resp  18   02/22/22 04:53


 


BP  153/92   02/22/22 04:53


 


Pulse Ox  95   02/22/22 04:53








                                 Intake & Output











 02/21/22 02/22/22 02/22/22





 18:59 06:59 18:59


 


Intake Total 1800  


 


Output Total  400 


 


Balance 1800 -400 


 


Weight 133.81 kg  


 


Intake:   


 


  Intake, IV Titration 1400  





  Amount   


 


    Sodium Chloride 0.9% 1, 1400  





    000 ml @ 130 mls/hr IV .   





    Q7H42M [facility] Rx#:[id number]   


 


  Oral 400  


 


Output:   


 


  Urine  400 


 


Other:   


 


  # Voids 3  














- Labs


CBC & Chem 7: 


                                 02/20/22 18:19





                                 02/21/22 05:15


Labs: 


                  Abnormal Lab Results - Last 24 Hours (Table)











  02/21/22 Range/Units





  05:15 


 


Carbon Dioxide  29.3 H  (20.0-27.5)  mmol/L














<Suma Padgett - Last Filed: 02/23/22 16:24>





Subjective











I reviewed the documentation as provided by the RAFAEL above, who is the original a

uthor of this note.  I agree with the documented assessment and plan, with the 

following changes: None





Objective





- Vital Signs


Vital signs: 


                                   Vital Signs











Temp  98.0 F   02/23/22 11:41


 


Pulse  63   02/23/22 11:41


 


Resp  20   02/23/22 11:41


 


BP  121/80   02/23/22 11:41


 


Pulse Ox  98   02/23/22 11:41








                                 Intake & Output











 02/22/22 02/23/22 02/23/22





 18:59 06:59 18:59


 


Intake Total 1560 1440 360


 


Balance 1560 1440 360


 


Intake:   


 


  Intake, IV Titration 1560 1040 





  Amount   


 


    Sodium Chloride 0.9% 1, 1560 1040 





    000 ml @ 130 mls/hr IV .   





    Q7H42M [facility] Rx#:[id number]   


 


  Oral  400 360


 


Other:   


 


  Voiding Method Toilet Toilet Toilet


 


  # Voids  2 2














- Labs


CBC & Chem 7: 


                                 02/20/22 18:19





                                 02/21/22 05:15

## 2022-02-22 NOTE — P.PN
Subjective


Progress Note Date: 02/22/22


Principal diagnosis: 





dehydration, dysphagia





in follow-up today patient is just returned from his swallowing eval, pending 

results.  He is tolerating Jell-O this morning, no vomiting.  He states that the

supportive medications are helping with the pain in the throat.





Objective





- Vital Signs


Vital signs: 


                                   Vital Signs











Temp  98.1 F   02/22/22 11:50


 


Pulse  77   02/22/22 11:50


 


Resp  18   02/22/22 11:50


 


BP  118/72   02/22/22 11:50


 


Pulse Ox  98   02/22/22 11:50








                                 Intake & Output











 02/21/22 02/22/22 02/22/22





 18:59 06:59 18:59


 


Intake Total 1800  


 


Output Total  400 


 


Balance 1800 -400 


 


Weight 133.81 kg  


 


Intake:   


 


  Intake, IV Titration 1400  





  Amount   


 


    Sodium Chloride 0.9% 1, 1400  





    000 ml @ 130 mls/hr IV .   





    Q7H42M [facility] Rx#:[id number]   


 


  Oral 400  


 


Output:   


 


  Urine  400 


 


Other:   


 


  # Voids 3  














- Constitutional


General appearance: Present: average body habitus, cooperative, no acute 

distress





- EENT


Eyes: Present: anicteric sclerae, EOMI


ENT: Present: hearing grossly normal, normal oropharynx





- Respiratory


Respiratory: bilateral: CTA





- Cardiovascular


Rhythm: regular


Heart sounds: normal: S1, S2


Abnormal Heart Sounds: Absent: systolic murmur, diastolic murmur, rub, S3 

Gallop, S4 Gallop, click, other





- Peripheral edema


  ** leg


Peripheral Edema: bilateral: None





- Gastrointestinal


General gastrointestinal: Present: normal bowel sounds, soft





- Neurologic


Neurologic: Present: CNII-XII intact





- Musculoskeletal


Musculoskeletal: Present: strength equal bilaterally





- Psychiatric


Psychiatric: Present: A&O x's 3, appropriate affect, intact judgment & insight





- Labs


CBC & Chem 7: 


                                 02/20/22 18:19





                                 02/21/22 05:15





Assessment and Plan


(1) Dehydration


Narrative/Plan: 


IV fluids and electrolyte replacements


Current Visit: Yes   Status: Acute   Priority: High   Code(s): E86.0 - 

DEHYDRATION   SNOMED Code(s): 02586655


   





(2) Dysphagia


Narrative/Plan: 


Kools solution with nystatin ordered 5xDay


Dietitian to assess pt and provide options for dysphagia/odynophagia


Current Visit: Yes   Status: Acute   Priority: High   Code(s): R13.10 - 

DYSPHAGIA, UNSPECIFIED   SNOMED Code(s): 50987008


   





(3) Squamous cell cancer of tongue


Narrative/Plan: 


Patient has completed 2/2 cycles of cisplatin, last dose was 7 days ago.  

Vomiting and electrolyte derangements are likely secondary to the same.  

Supportive medications, hydration are all ordered.  Labs improved today





Radiation Oncologistto f/u today with pt and determine when resumption of 

therapy is most appropriate.





Did review case with Speech therapist, pending swallow eval and recs.  If pt is 

aspirating then he will need PEG placement.  If he is is not, then cont 

supportive meds.  Will plan for hydration in the outpt setting for the next 4 

weeks.  


Pt dysphagia and odynophagia are improved today.   He did tolerate jello.  


Dietitian has seen pt.  


Current Visit: Yes   Status: Acute   Priority: High   Code(s): C02.9 - MALIGNANT

NEOPLASM OF TONGUE, UNSPECIFIED   SNOMED Code(s): 234665545


   





(4) Vomiting


Narrative/Plan: 


Olanzapine and zofran ordered.  No vomiting today


Current Visit: Yes   Status: Acute   Priority: High   Code(s): R11.10 - 

VOMITING, UNSPECIFIED   SNOMED Code(s): 848910440


   


Plan: 





Doctor attests:


I performed a history and physical examination of this patient, developed 

impression and plan of care, discussed with dictator.  I agree with dictators 

note, documented as a scribe.

## 2022-02-22 NOTE — FL
EXAMINATION TYPE: FL barium swallow w video

 

DATE OF EXAM: 2/22/2022

 

MODIFIED SWALLOW / DEGLUTITION STUDY

 

CLINICAL HISTORY: Rule out aspiration

 

TECHNIQUE:  Deglutition study is performed utilizing thin liquid barium, honey and nectar thick liqui
d barium, barium thick applesauce, and barium coated cracker.

 

COMPARISON: None.

 

FINDINGS: There is no evidence of penetration or aspiration with any modality tested. Fluoroscopic ti
me of 1 minute and 3 seconds. No images provided.

 

IMPRESSION: No laryngeal penetration or aspiration at the time of the study.  Please refer to speech 
therapist notes for further details.

## 2022-02-23 RX ADMIN — ENOXAPARIN SODIUM SCH MG: 40 INJECTION SUBCUTANEOUS at 09:23

## 2022-02-23 RX ADMIN — MORPHINE SULFATE PRN MG: 4 INJECTION, SOLUTION INTRAMUSCULAR; INTRAVENOUS at 09:22

## 2022-02-23 RX ADMIN — ACETAMINOPHEN SCH ML: 160 SOLUTION ORAL at 09:29

## 2022-02-23 RX ADMIN — ACETAMINOPHEN SCH ML: 160 SOLUTION ORAL at 04:20

## 2022-02-23 RX ADMIN — ACETAMINOPHEN SCH ML: 160 SOLUTION ORAL at 17:19

## 2022-02-23 RX ADMIN — CEFAZOLIN SCH MLS/HR: 330 INJECTION, POWDER, FOR SOLUTION INTRAMUSCULAR; INTRAVENOUS at 09:32

## 2022-02-23 RX ADMIN — ACETAMINOPHEN SCH ML: 160 SOLUTION ORAL at 19:32

## 2022-02-23 RX ADMIN — CEFAZOLIN SCH MLS/HR: 330 INJECTION, POWDER, FOR SOLUTION INTRAMUSCULAR; INTRAVENOUS at 04:21

## 2022-02-23 RX ADMIN — PANTOPRAZOLE SODIUM SCH MG: 40 TABLET, DELAYED RELEASE ORAL at 09:23

## 2022-02-23 RX ADMIN — ONDANSETRON PRN MG: 2 INJECTION INTRAMUSCULAR; INTRAVENOUS at 11:46

## 2022-02-23 RX ADMIN — ATORVASTATIN CALCIUM SCH MG: 20 TABLET, FILM COATED ORAL at 19:31

## 2022-02-23 RX ADMIN — CEFAZOLIN SCH MLS/HR: 330 INJECTION, POWDER, FOR SOLUTION INTRAMUSCULAR; INTRAVENOUS at 17:20

## 2022-02-23 NOTE — P.PN
<Gunner Delgadillo - Last Filed: 02/23/22 12:14>





Subjective


Progress Note Date: 02/23/22





Hospital course:





Patient is a very pleasant 51-year-old male recently diagnosed with throat can

cer (squamous cell carcinoma ) and began chemotherapy and radiation 4 weeks ago.

 Patient reports follows with oncologist Dr. Patel in office and Dr. Soto 

for radiation/chemotherapy treatments.  He presented to the emergency department

2/20/22 with a chief complaint of worsening dysphasia patient reports this began

approximately 1 weeks ago after undergoing his second course of chemotherapy on 

2/14/22 and has progressively worsened.  Patient reports last radiation 

treatment on 2/20/22 and next dose due today.  In the emergency department, 

patient underwent x-ray soft tissue of neck which was negative for cervical soft

tissue abnormalities.  Chest x-ray was negative for acute cardiopulmonary 

process   An KUB was also negative for acute process.  Patient was found to have

leukopenia with WBC count of 3.6, hyponatremia with sodium of 133, and 

hypokalemia with potassium of 3.3.  Labs otherwise showing no significant 

abnormalities.  Covid PCR was negative.





Physical exam:





Patient seen and fully evaluated at the bedside this morning.  Patient doing 

well this morning.  He has been tolerating oral intake and diet being increased 

to full liquids.  Patient just returned from radiation therapy.  He continues to

deny having any headache, lightheadedness, dizziness, chest pain, palpitations, 

or shortness of breath.  





Vital signs reviewed and stable. 


General: Nontoxic, no distress and appears stated age.  


Derm: Skin warm and dry, normal coloration for ethnicity.


Head: Atraumatic, normocephalic and symmetric.  


Eyes: EOMs intact, no lid lag, and anicteric sclera


Mouth: no lip lesions, mucus membranes dry


Cardiovascular: regular rate and rhythm with normal S1S2, no murmur, positive 

posterior tibial pulses bilaterally, and cap refill < 2 seconds.  


Lungs: Respirations even, regular, and unlabored on room air. Lungs CTA 

bilaterally, no rhonchi, no rales, no wheezing, and no accessory muscle usage. 


Abdominal: soft, nontender to palpation, no guarding, no appreciable 

organomegaly


Ext: ROM intact. No gross muscle atrophy, no edema, no contractures


Neuro: Speech clear, face symmetrical and CN II-XII grossly intact with no noted

focal neuro deficits


Psych: Alert and oriented to person, place, time, and situation. Appropriate and

pleasant affect. 





Assessment and Plan of Care:





Acute mucositis, radiation-induced oral mucositis


Squamous cell carcinoma of the throat


Dysphasia


Xerostomia


-Continue with artificial saliva


-Magic mouthwash


-Nystatin


-Symptomatic and supportive measures


-Pain control with opiates


-Oncology following appreciate further recommendations


-Radiation oncologist following, Dr. Soto and he has arranged for continued r

adiation treatments.


-Continue hydration with IV fluids.





Leukopenia, secondary to current anti-neoplastic medications/treatment


-We will provide neutropenic precautions for patient safety and continue to 

monitor with repeat a.m. labs.





Hypokalemia, resolved





Hyponatremia, resolved








CODE STATUS: Full code


DVT prophylaxis: Lovenox


Discussed with: Patient and RN


Anticipated discharge date: Clinical course to determine


Anticipated discharge place: Home


A total of 35 minutes was spent on the care of this complex patient more than 

50% of the time was spent in counseling and care coordination.











Objective





- Vital Signs


Vital signs: 


                                   Vital Signs











Temp  98.7 F   02/23/22 05:00


 


Pulse  70   02/23/22 05:00


 


Resp  18   02/23/22 05:00


 


BP  134/64   02/23/22 05:00


 


Pulse Ox  98   02/23/22 05:00








                                 Intake & Output











 02/22/22 02/23/22 02/23/22





 18:59 06:59 18:59


 


Intake Total 1560 1440 


 


Balance 1560 1440 


 


Intake:   


 


  Intake, IV Titration 1560 1040 





  Amount   


 


    Sodium Chloride 0.9% 1, 1560 1040 





    000 ml @ 130 mls/hr IV .   





    Q7H42M [facility] Rx#:[id number]   


 


  Oral  400 


 


Other:   


 


  Voiding Method Toilet Toilet 


 


  # Voids  2 














- Labs


CBC & Chem 7: 


                                 02/20/22 18:19





                                 02/21/22 05:15





<Suma Padgett - Last Filed: 02/23/22 16:16>





Subjective








I reviewed the documentation as provided by the RAFAEL above, who is the original 

author of this note.  I agree with the documented assessment and plan, with the 

following changes: None





Objective





- Vital Signs


Vital signs: 


                                   Vital Signs











Temp  98.0 F   02/23/22 11:41


 


Pulse  63   02/23/22 11:41


 


Resp  20   02/23/22 11:41


 


BP  121/80   02/23/22 11:41


 


Pulse Ox  98   02/23/22 11:41








                                 Intake & Output











 02/22/22 02/23/22 02/23/22





 18:59 06:59 18:59


 


Intake Total 1560 1440 360


 


Balance 1560 1440 360


 


Intake:   


 


  Intake, IV Titration 1560 1040 





  Amount   


 


    Sodium Chloride 0.9% 1, 1560 1040 





    000 ml @ 130 mls/hr IV .   





    Q7H42M [facility] Rx#:[id number]   


 


  Oral  400 360


 


Other:   


 


  Voiding Method Toilet Toilet Toilet


 


  # Voids  2 2














- Labs


CBC & Chem 7: 


                                 02/20/22 18:19





                                 02/21/22 05:15

## 2022-02-23 NOTE — P.PN
Subjective


Progress Note Date: 02/23/22


Principal diagnosis: 





dehydration, dysphagia


Iin f/u today patient is requesting diet advancement to soft.  He did not 

aspirate on swallow eval.  No further nausea.  Supportive medications cont to 

help with the pain in the throat.





Objective





- Vital Signs


Vital signs: 


                                   Vital Signs











Temp  98.7 F   02/23/22 05:00


 


Pulse  70   02/23/22 05:00


 


Resp  18   02/23/22 05:00


 


BP  134/64   02/23/22 05:00


 


Pulse Ox  98   02/23/22 05:00








                                 Intake & Output











 02/22/22 02/23/22 02/23/22





 18:59 06:59 18:59


 


Intake Total 1560 1440 


 


Balance 1560 1440 


 


Intake:   


 


  Intake, IV Titration 1560 1040 





  Amount   


 


    Sodium Chloride 0.9% 1, 1560 1040 





    000 ml @ 130 mls/hr IV .   





    Q7H42M [facility] Rx#:[id number]   


 


  Oral  400 


 


Other:   


 


  Voiding Method Toilet Toilet 


 


  # Voids  2 














- Constitutional


General appearance: Present: average body habitus, cooperative, no acute 

distress





- EENT


Eyes: Present: anicteric sclerae, EOMI


ENT: Present: hearing grossly normal





- Respiratory


Details: 





resp even and unlabored





- Integumentary


Integumentary: Present: normal





- Neurologic


Neurologic: Present: CNII-XII intact





- Musculoskeletal


Musculoskeletal: Present: strength equal bilaterally





- Psychiatric


Psychiatric: Present: A&O x's 3, appropriate affect, intact judgment & insight





- Labs


CBC & Chem 7: 


                                 02/20/22 18:19





                                 02/21/22 05:15





- Imaging and Cardiology





barium swallow report reviewed





Assessment and Plan


(1) Dehydration


Narrative/Plan: 


Improved labs.  Pt feeling better.  He is only receiving nutrition and fluids 

orally now.  


Current Visit: Yes   Status: Acute   Priority: High   Code(s): E86.0 - 

DEHYDRATION   SNOMED Code(s): 01377093


   





(2) Dysphagia


Narrative/Plan: 


2/2 eso adeno and treatment.  


Kools solution with nystatin ordered 5xDay


Dietitian has seen pt and provided nutritional options for dysphagia/odynophagia


Current Visit: Yes   Status: Acute   Priority: High   Code(s): R13.10 - 

DYSPHAGIA, UNSPECIFIED   SNOMED Code(s): 64490928


   





(3) Squamous cell cancer of tongue


Narrative/Plan: 


Patient has completed 2/2 cycles of cisplatin, last dose was about 10 days ago. 

Vomiting and electrolyte derangements were likely secondary to the same and are 

now improved.  Labs improved.  





Discussed with Radiation Oncologist today.  Pt has 8 more XRT to complete.  

Plans to complete.  Resumption of therapy soon.  





No aspiration, no need for PEG.  Cont meds for symptoms.  Will plan for 

hydration in the outpt setting for the next 4 weeks PRN.  





Pt asked for soft food diet.  Will try. 





Current Visit: Yes   Status: Acute   Priority: High   Code(s): C02.9 - MALIGNANT

NEOPLASM OF TONGUE, UNSPECIFIED   SNOMED Code(s): 145154644


   





(4) Vomiting


Narrative/Plan: 


Olanzapine and zofran ordered.  


Current Visit: Yes   Status: Resolved   Priority: High   Code(s): R11.10 - 

VOMITING, UNSPECIFIED   SNOMED Code(s): 527043858


   


Plan: 


Pt is ok from Hem/Onc standpoint to be discharged once cleared by Attending and 

Consulting MDs








Doctor attests:


I performed a history and physical examination of this patient, developed 

impression and plan of care, discussed with dictator.  I agree with dictators 

note, documented as a scribe.

## 2022-02-24 VITALS
DIASTOLIC BLOOD PRESSURE: 80 MMHG | TEMPERATURE: 98.2 F | RESPIRATION RATE: 16 BRPM | SYSTOLIC BLOOD PRESSURE: 136 MMHG | HEART RATE: 57 BPM

## 2022-02-24 RX ADMIN — CEFAZOLIN SCH: 330 INJECTION, POWDER, FOR SOLUTION INTRAMUSCULAR; INTRAVENOUS at 03:42

## 2022-02-24 RX ADMIN — ENOXAPARIN SODIUM SCH MG: 40 INJECTION SUBCUTANEOUS at 09:26

## 2022-02-24 RX ADMIN — ACETAMINOPHEN SCH ML: 160 SOLUTION ORAL at 12:35

## 2022-02-24 RX ADMIN — CEFAZOLIN SCH: 330 INJECTION, POWDER, FOR SOLUTION INTRAMUSCULAR; INTRAVENOUS at 12:35

## 2022-02-24 RX ADMIN — PANTOPRAZOLE SODIUM SCH MG: 40 TABLET, DELAYED RELEASE ORAL at 09:27

## 2022-02-24 RX ADMIN — ACETAMINOPHEN SCH ML: 160 SOLUTION ORAL at 04:03

## 2022-02-24 NOTE — P.PN
Subjective


Progress Note Date: 02/24/22


Principal diagnosis: 





dehydration, dysphagia


In f/u today patient tolerated his soft diet pretty well.  He restarted XRT when

he started feeling better.  No further nausea.  Supportive medications cont to 

help with the pain in the throat.





Objective





- Vital Signs


Vital signs: 


                                   Vital Signs











Temp  97.6 F   02/24/22 07:38


 


Pulse  74   02/24/22 08:00


 


Resp  17   02/24/22 08:04


 


BP  134/84   02/24/22 07:38


 


Pulse Ox  92 L  02/24/22 04:00








                                 Intake & Output











 02/23/22 02/24/22 02/24/22





 18:59 06:59 18:59


 


Intake Total 3170 1560 


 


Balance 3170 1560 


 


Intake:   


 


  Intake, IV Titration 1440 1560 





  Amount   


 


    Sodium Chloride 0.9% 1, 1440 1560 





    000 ml @ 130 mls/hr IV .   





    Q7H42M [facility] Rx#:[id number]   


 


  Oral 1730  


 


Other:   


 


  Voiding Method Toilet Toilet Toilet


 


  # Voids 5  














- Constitutional


General appearance: Present: average body habitus, cooperative, no acute 

distress





- EENT


Eyes: Present: anicteric sclerae, EOMI


ENT: Present: hearing grossly normal, normal oropharynx





- Respiratory


Details: 





resp even and unlabored





- Neurologic


Neurologic: Present: CNII-XII intact





- Musculoskeletal


Musculoskeletal: Present: strength equal bilaterally





- Psychiatric


Psychiatric: Present: A&O x's 3, appropriate affect, intact judgment & insight





- Labs


CBC & Chem 7: 


                                 02/20/22 18:19





                                 02/21/22 05:15





Assessment and Plan


(1) Dehydration


Narrative/Plan: 


Labs stable.  Pt feels decent.  He has only received nutrition and fluids orally

for about 48 hours and is doing well.  IV hydration is available to him in the 

ofc if needed.   


Current Visit: Yes   Status: Acute   Priority: High   Code(s): E86.0 - 

DEHYDRATION   SNOMED Code(s): 72366468


   





(2) Dysphagia


Narrative/Plan: 


2/2 eso adeno and treatment.  


Kools solution with nystatin ordered 5xDay-Rx were sent to pharmacy


Dietitian has seen pt and provided nutritional options for dysphagia/odynophagia


Current Visit: Yes   Status: Acute   Priority: High   Code(s): R13.10 - 

DYSPHAGIA, UNSPECIFIED   SNOMED Code(s): 25231674


   





(3) Squamous cell cancer of tongue


Narrative/Plan: 


Patient has completed 2/2 cycles of cisplatin, last dose was about 11 days ago. 

Vomiting and electrolyte derangements were likely secondary to the same and are 

now improved.  Labs improved.  





Discussed with Radiation Oncologist previously.  Plans to complete 6 weeks total

of therapy.  XRT was resumed already.  





No aspiration, no need for PEG.  Cont meds for symptoms.  Orders for hydration 

in the outpt setting for the next 4 weeks PRN has been sent  








Current Visit: Yes   Status: Acute   Priority: High   Code(s): C02.9 - MALIGNANT

NEOPLASM OF TONGUE, UNSPECIFIED   SNOMED Code(s): 134586365


   





(4) Vomiting


Narrative/Plan: 


Olanzapine and zofran ordered.  


Current Visit: Yes   Status: Resolved   Priority: High   Code(s): R11.10 - 

VOMITING, UNSPECIFIED   SNOMED Code(s): 117058303


   


Plan: 


Pt is ok from Hem/Onc standpoint to be discharged once cleared by Attending and 

Consulting MDs.  Dr. Mcdonald did briefly discuss case with Attending








Doctor attests:


I performed a history and physical examination of this patient, developed 

impression and plan of care, discussed with dictator.  I agree with dictators 

note, documented as a scribe.

## 2022-02-24 NOTE — P.DS
Providers


Date of admission: 


02/20/22 18:36





Expected date of discharge: 02/24/22


Attending physician: 


Mary Peterson DO





Consults: 





                                        





02/20/22 18:36


Consult Physician Urgent 


   Consulting Provider: Blair Mcdonald


   Consult Reason/Comments: Oncological care


   Do you want consulting provider notified?: Yes











Primary care physician: 


Lakesha Diana DO





Hospital Course: 





Discharge Diagnosis:


Acute mucositis, radiation-induced oral mucositis


Squamous cell carcinoma of the throat


Dysphasia


Xerostomia


Leukopenia, secondary to current anti-neoplastic medications/treatment


Hypokalemia, resolved


Hyponatremia, resolved





Hospital Course: 


Patient is a very pleasant 51-year-old male recently diagnosed with throat 

cancer (squamous cell carcinoma ) and began chemotherapy and radiation 4 weeks 

ago.  Patient reports follows with oncologist Dr. Patel in office and Dr. Soto for radiation/chemotherapy treatments.  He presented to the emergency 

department 2/20/22 with a chief complaint of worsening dysphasia patient reports

this began approximately 1 weeks ago after undergoing his second course of 

chemotherapy on 2/14/22 and has progressively worsened.  Patient reports last 

radiation treatment on 2/20/22 and next dose due today.  In the emergency 

department, patient underwent x-ray soft tissue of neck which was negative for 

cervical soft tissue abnormalities.  Chest x-ray was negative for acute 

cardiopulmonary process   An KUB was also negative for acute process.  Patient 

was found to have leukopenia with WBC count of 3.6, hyponatremia with sodium of 

133, and hypokalemia with potassium of 3.3.  Labs otherwise showing no 

significant abnormalities.  Covid PCR was negative.





Patient was started on pain medications.  He was also started on statins 

essential for oral thrush.  Patient was seen by swallow eval and he is cleared 

for regular diet.  Patient's diet was gradually advanced.  At the time of 

discharge patient reported that he was able to eat his eggs for breakfast.  

Patient was seen by hematology and deemed stable for discharge.  I was present 

in the room when oncology was rounding.  Patient told to follow oncology for 

routine IV infusions to keep him hydrated.  Patient will follow with his 

radiation oncologist to complete his radiation therapies.








Patient seen and examined at bedside.[]





Vital signs reviewed and stable. 


General: [non toxic], [no distress], [appears at stated age]


Derm: [warm], [dry]


Head: [atraumatic], [normocephalic], [symmetric]


Eyes: [EOMI], [no lid lag], [anicteric sclera]


Mouth: [Dry oral mucosa


Cardiovascular: [S1S2 reg], [no murmur], [positive posterior tibial pulse 

bilateral], 


Lungs: [CTA bilateral], [no rhonchi, no rales] , [no accessory muscle use]


Abdominal: [soft], [ nontender to palpation], [no guarding], [no appreciable 

organomegaly]


Ext: [no gross muscle atrophy], [no edema], [no contractures]


Neuro: [ CN II-XI grossly intact], [no focal neuro deficits]


Psych: [Alert], [oriented], [appropriate affect] 








A total of [32] minutes of time were spent preparing this complex discharge 

summary .


Patient Condition at Discharge: Poor





Plan - Discharge Summary


New Discharge Prescriptions: 


New


   Nystatin 100,000 Unit/ml Susp [Mycostatin Oral Susp] 3,000,000 unit PO 5XD 4 

Days #60 ml





Continue


   Hydrocodone/Acetaminophen [Hydrocodone/Acetaminophen 7.5-325/15 Ml] 15 ml PO 

Q8H PRN


     PRN Reason: Pain


   hydroCHLOROthiazide 25 mg PO DAILY


   Omeprazole 40 mg PO DAILY


   Atorvastatin [Lipitor] 20 mg PO HS


Discharge Medication List





Atorvastatin [Lipitor] 20 mg PO HS 02/20/22 [History]


Hydrocodone/Acetaminophen [Hydrocodone/Acetaminophen 7.5-325/15 Ml] 15 ml PO Q8H

PRN 02/20/22 [History]


Omeprazole 40 mg PO DAILY 02/20/22 [History]


hydroCHLOROthiazide 25 mg PO DAILY 02/20/22 [History]


Nystatin 100,000 Unit/ml Susp [Mycostatin Oral Susp] 3,000,000 unit PO 5XD 4 

Days #60 ml 02/24/22 [Rx]








Follow up Appointment(s)/Referral(s): 


Blair Mcdonald MD [STAFF PHYSICIAN] - 1 Week


Lakesha Diana DO [Primary Care Provider] - 1-2 days


Lenard Patel MD [STAFF PHYSICIAN] - 03/01/22 8:45 am


Discharge Disposition: HOME SELF-CARE

## 2022-03-09 ENCOUNTER — HOSPITAL ENCOUNTER (OUTPATIENT)
Dept: HOSPITAL 47 - EC | Age: 52
Setting detail: OBSERVATION
LOS: 1 days | Discharge: HOME | End: 2022-03-10
Attending: INTERNAL MEDICINE | Admitting: INTERNAL MEDICINE
Payer: COMMERCIAL

## 2022-03-09 VITALS — BODY MASS INDEX: 36.3 KG/M2

## 2022-03-09 DIAGNOSIS — T20.07XA: ICD-10-CM

## 2022-03-09 DIAGNOSIS — K21.9: ICD-10-CM

## 2022-03-09 DIAGNOSIS — Z92.3: ICD-10-CM

## 2022-03-09 DIAGNOSIS — T50.8X5A: ICD-10-CM

## 2022-03-09 DIAGNOSIS — C14.0: ICD-10-CM

## 2022-03-09 DIAGNOSIS — K12.32: Primary | ICD-10-CM

## 2022-03-09 DIAGNOSIS — Z79.899: ICD-10-CM

## 2022-03-09 DIAGNOSIS — R62.7: ICD-10-CM

## 2022-03-09 DIAGNOSIS — T31.0: ICD-10-CM

## 2022-03-09 DIAGNOSIS — K11.7: ICD-10-CM

## 2022-03-09 DIAGNOSIS — T45.1X5A: ICD-10-CM

## 2022-03-09 DIAGNOSIS — E86.0: ICD-10-CM

## 2022-03-09 DIAGNOSIS — R11.2: ICD-10-CM

## 2022-03-09 DIAGNOSIS — C77.0: ICD-10-CM

## 2022-03-09 DIAGNOSIS — K12.0: ICD-10-CM

## 2022-03-09 DIAGNOSIS — E78.5: ICD-10-CM
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
## 2022-03-09 DIAGNOSIS — I10: ICD-10-CM

## 2022-03-09 DIAGNOSIS — E66.9: ICD-10-CM

## 2022-03-09 DIAGNOSIS — D70.1: ICD-10-CM

## 2022-03-09 DIAGNOSIS — Z71.9: ICD-10-CM

## 2022-03-09 DIAGNOSIS — C02.9: ICD-10-CM

## 2022-03-09 DIAGNOSIS — Z92.21: ICD-10-CM

## 2022-03-09 LAB
ALBUMIN SERPL-MCNC: 4 G/DL (ref 3.5–5)
ALP SERPL-CCNC: 91 U/L (ref 38–126)
ALT SERPL-CCNC: 39 U/L (ref 4–49)
ANION GAP SERPL CALC-SCNC: 10 MMOL/L
AST SERPL-CCNC: 27 U/L (ref 17–59)
BASOPHILS # BLD AUTO: 0 K/UL (ref 0–0.2)
BASOPHILS NFR BLD AUTO: 1 %
BILIRUB UR QL STRIP.AUTO: (no result)
BUN SERPL-SCNC: 18 MG/DL (ref 9–20)
CALCIUM SPEC-MCNC: 9 MG/DL (ref 8.4–10.2)
CHLORIDE SERPL-SCNC: 98 MMOL/L (ref 98–107)
CO2 SERPL-SCNC: 29 MMOL/L (ref 22–30)
EOSINOPHIL # BLD AUTO: 0 K/UL (ref 0–0.7)
EOSINOPHIL NFR BLD AUTO: 1 %
ERYTHROCYTE [DISTWIDTH] IN BLOOD BY AUTOMATED COUNT: 4.29 M/UL (ref 4.3–5.9)
ERYTHROCYTE [DISTWIDTH] IN BLOOD: 15.7 % (ref 11.5–15.5)
GLUCOSE SERPL-MCNC: 87 MG/DL (ref 74–99)
HCT VFR BLD AUTO: 37.9 % (ref 39–53)
HGB BLD-MCNC: 13.6 GM/DL (ref 13–17.5)
KETONES UR QL STRIP.AUTO: (no result)
LYMPHOCYTES # SPEC AUTO: 0.7 K/UL (ref 1–4.8)
LYMPHOCYTES NFR SPEC AUTO: 22 %
MCH RBC QN AUTO: 31.8 PG (ref 25–35)
MCHC RBC AUTO-ENTMCNC: 36 G/DL (ref 31–37)
MCV RBC AUTO: 88.5 FL (ref 80–100)
MONOCYTES # BLD AUTO: 0.3 K/UL (ref 0–1)
MONOCYTES NFR BLD AUTO: 9 %
NEUTROPHILS # BLD AUTO: 1.9 K/UL (ref 1.3–7.7)
NEUTROPHILS NFR BLD AUTO: 64 %
PH UR: 6 [PH] (ref 5–8)
PLATELET # BLD AUTO: 201 K/UL (ref 150–450)
POTASSIUM SERPL-SCNC: 3.5 MMOL/L (ref 3.5–5.1)
PROT SERPL-MCNC: 6.9 G/DL (ref 6.3–8.2)
PROT UR QL: (no result)
RBC UR QL: 2 /HPF (ref 0–5)
SODIUM SERPL-SCNC: 137 MMOL/L (ref 137–145)
SP GR UR: 1.03 (ref 1–1.03)
SQUAMOUS UR QL AUTO: 1 /HPF (ref 0–4)
UROBILINOGEN UR QL STRIP: 6 MG/DL (ref ?–2)
WBC # BLD AUTO: 3 K/UL (ref 3.8–10.6)
WBC # UR AUTO: 9 /HPF (ref 0–5)

## 2022-03-09 PROCEDURE — 99284 EMERGENCY DEPT VISIT MOD MDM: CPT

## 2022-03-09 PROCEDURE — 36415 COLL VENOUS BLD VENIPUNCTURE: CPT

## 2022-03-09 PROCEDURE — 92610 EVALUATE SWALLOWING FUNCTION: CPT

## 2022-03-09 PROCEDURE — 85025 COMPLETE CBC W/AUTO DIFF WBC: CPT

## 2022-03-09 PROCEDURE — 96375 TX/PRO/DX INJ NEW DRUG ADDON: CPT

## 2022-03-09 PROCEDURE — 96366 THER/PROPH/DIAG IV INF ADDON: CPT

## 2022-03-09 PROCEDURE — 80053 COMPREHEN METABOLIC PANEL: CPT

## 2022-03-09 PROCEDURE — 83605 ASSAY OF LACTIC ACID: CPT

## 2022-03-09 PROCEDURE — 81001 URINALYSIS AUTO W/SCOPE: CPT

## 2022-03-09 PROCEDURE — 96372 THER/PROPH/DIAG INJ SC/IM: CPT

## 2022-03-09 PROCEDURE — 96361 HYDRATE IV INFUSION ADD-ON: CPT

## 2022-03-09 PROCEDURE — 96376 TX/PRO/DX INJ SAME DRUG ADON: CPT

## 2022-03-09 PROCEDURE — 96365 THER/PROPH/DIAG IV INF INIT: CPT

## 2022-03-09 RX ADMIN — POTASSIUM CHLORIDE SCH MLS/HR: 14.9 INJECTION, SOLUTION INTRAVENOUS at 18:32

## 2022-03-09 RX ADMIN — ACETAMINOPHEN SCH ML: 160 SOLUTION ORAL at 18:07

## 2022-03-09 RX ADMIN — SODIUM CHLORIDE SCH MLS/HR: 900 INJECTION, SOLUTION INTRAVENOUS at 18:30

## 2022-03-09 RX ADMIN — PANTOPRAZOLE SODIUM SCH MG: 40 INJECTION, POWDER, FOR SOLUTION INTRAVENOUS at 21:46

## 2022-03-09 NOTE — P.HPIM
History of Present Illness


H&P Date: 03/09/22





History of Presenting Illness:





Patient is a very pleasant 51-year-old male recently diagnosed with throat 

cancer (squamous cell carcinoma ) and began chemotherapy and radiation 6 weeks 

ago and just completed last radiation treatment.  Patient reports follows with 

oncologist Dr. Patel in office and Dr. Soto for radiation/chemotherapy 

treatments.  He presented to the emergency department secondary to dysphasia.  

Patient reports inability to eat or drink anything in the past 2-3 days after 

completing last radiation treatment on Monday.  Patient reports in addition to 

this he has had persistent spitting up of white/cream-colored sputum, continuous

nausea and has soreness to the inside of his left cheek.  Patient denies having 

any fevers, chills, headache, lightheadedness, dizziness, chest pain, 

palpitations, shortness of breath, dyspnea with exertion, vomiting, abdominal 

pain, or experiencing any numbness/tingling/weakness in his extremities.  In the

emergency department patient underwent full evaluation.  Labs drawn revealing 

leukopenia with WBC count 3.0 otherwise labs unremarkable.  Urinalysis negative 

for infection.  ER provider spoke with oncology recommending starting patient on

acyclovir and admitting to observation unit under our services. 





Review of systems:





Pertinent positives and negatives as discussed in HPI, a complete review of 

systems was performed and all other systems are negative.





Physical exam:





Vital signs reviewed and stable. 


General: Nontoxic, no distress and appears stated age.  


Derm: Skin warm and dry, normal coloration for ethnicity.  Patient has a 

radiation burn to left anterior side of neck.


Head: Atraumatic, normocephalic and symmetric.  


Eyes: EOMs intact, no lid lag, and anicteric sclera


Mouth: no lip lesions, mucus membranes moist, left buccal area erythematous, no 

distinct ulcer or lesion noted on assessment however patient did have 

difficulties keeping mouth open and sticking out tongue due to consistently 

having to spit out secretions.


Cardiovascular: regular rate and rhythm with normal S1S2, no murmur, positive 

posterior tibial pulses bilaterally, and cap refill < 2 seconds.  


Lungs: Respirations even, regular, and unlabored on room air. Lungs CTA 

bilaterally, no rhonchi, no rales, no wheezing, and no accessory muscle usage. 


Abdominal: soft, nontender to palpation, no guarding, no appreciable 

organomegaly


Ext: ROM intact. No gross muscle atrophy, no edema, no contractures


Neuro: Speech clear, face symmetrical and CN II-XII grossly intact with no noted

focal neuro deficits


Psych: Alert and oriented to person, place, time, and situation. Appropriate and

pleasant affect. 





Assessment and Plan of Care:





Acute mucositis, radiation-induced oral mucositis


Squamous cell carcinoma of the throat


Dysphasia


Xerostomia


-Artificial saliva


-Magic mouthwash with nystatin, Benadryl, lidocaine, and Maalox


-Hurricaine spray


-Symptomatic and supportive measures


-Pain control with opiates


-Oncology consulted appreciate further recommendations


-Continue hydration with IV fluids.





Leukopenia, secondary to current anti-neoplastic medications/treatment


-We will provide neutropenic precautions for patient safety and continue to 

monitor with repeat a.m. labs.





GERD


PPI prophylaxis with Protonix 40 mg IVP twice daily





Hyperlipidemia


-Continue daily medication regimen with atorvastatin 20 mg nightly.











CODE STATUS: Full code


DVT prophylaxis: Lovenox


Discussed with: Patient and RN


Anticipated discharge date: Clinical course to determine


Anticipated discharge place: Home


A total of 45 minutes was spent on the care of this complex patient more than 

50% of the time was spent in counseling and care coordination.








Past Medical History


Past Medical History: Cancer, Hyperlipidemia, Hypertension


Additional Past Medical History / Comment(s): chemo- 2/14/2022.  radiation- 

2-.  cx of throat and tounge stage 2.


History of Any Multi-Drug Resistant Organisms: None Reported


Additional Past Surgical History / Comment(s): Arm surgery, has had colonoscopy


Additional Past Anesthesia/Blood Transfusion Reaction / Comment(s): No hx of 

transfusion at this time


Past Psychological History: No Psychological Hx Reported


Smoking Status: Never smoker


Past Alcohol Use History: Unable to Obtain


Past Drug Use History: None Reported





- Past Family History


  ** famioy 


Family Medical History: No Reported History





Medications and Allergies


                                Home Medications











 Medication  Instructions  Recorded  Confirmed  Type


 


Atorvastatin [Lipitor] 20 mg PO HS 02/20/22 03/09/22 History


 


Hydrocodone/Acetaminophen 15 ml PO Q8H PRN 02/20/22 03/09/22 History





[Hydrocodone/Acetaminophen    





7.5-325/15 Ml]    


 


Omeprazole 40 mg PO DAILY 02/20/22 03/09/22 History


 


hydroCHLOROthiazide 25 mg PO DAILY 02/20/22 03/09/22 History


 


Cornell's 5 ml PO Q4H PRN 03/09/22 03/09/22 History





Solution(Maalox,Benadryl,Lidocaine    





1:1)    


 


Mag Hydrox/Al Hydrox/Simeth 5 ml PO Q4H PRN 03/09/22 03/09/22 History





[Maalox]    








                                    Allergies











Allergy/AdvReac Type Severity Reaction Status Date / Time


 


No Known Allergies Allergy   Verified 03/09/22 14:19














Physical Exam


Vitals: 


                                   Vital Signs











  Temp Pulse Resp BP Pulse Ox


 


 03/09/22 11:05  97.8 F  66  18  107/73  99








                                Intake and Output











 03/09/22 03/09/22 03/09/22





 06:59 14:59 22:59


 


Other:   


 


  Weight  121.563 kg 














Results


CBC & Chem 7: 


                                 03/09/22 14:18





                                 03/09/22 14:18


Labs: 


                  Abnormal Lab Results - Last 24 Hours (Table)











  03/09/22 03/09/22 Range/Units





  14:18 15:06 


 


WBC  3.0 L   (3.8-10.6)  k/uL


 


RBC  4.29 L   (4.30-5.90)  m/uL


 


Hct  37.9 L   (39.0-53.0)  %


 


RDW  15.7 H   (11.5-15.5)  %


 


Lymphocytes #  0.7 L   (1.0-4.8)  k/uL


 


Urine Protein   1+ H  (Negative)  


 


Urine Ketones   3+ H  (Negative)  


 


Urine Bilirubin   1+ H  (Negative)  


 


Urine WBC   9 H  (0-5)  /hpf


 


Amorphous Sediment   Rare H  (None)  /hpf


 


Urine Mucus   Many H  (None)  /hpf
Vomiting and diarrhea

## 2022-03-09 NOTE — ED
General Adult HPI





- General


Chief complaint: Recheck/Abnormal Lab/Rx


Stated complaint: Can't swallow


Time Seen by Provider: 03/09/22 12:32


Source: patient, family


Mode of arrival: wheelchair


Limitations: no limitations





- History of Present Illness


Initial comments: 





This 51-year-old male with a past medical history of throat and tongue cancer 

presents to the emergency department coming from [facility].  

Patient states he went to the Center to get fluids to him not being able to eat 

or drink for the last couple of days, however they would like him to be admitted

for further workup, fluids and antibiotics.  Patient states he has a small sore 

on the inside of his left cheek that they want him to get antibiotics for.  

Patient states he has had no episodes similar to this a week ago where he was 

admitted to the hospital.  Patient states he has been able to keep down a little

bit of water over the last couple days, however has been vomiting up some of it 

and has not been able to eat any food.  Patient states all fluids and solids 

have a bad taste in seem like they're getting stuck in his throat when trying to

swallow.  Patient currently does not feel like anything is stuck in his throat 

at this time.  Patient denies any fevers, chest pain, or shortness of breath. 

Patient denies any hemoptysis.  Patient denies any abdominal pain, headache, 

fever, change in vision or change in his urination.  She states is diagnosed wit

throat/tongue cancer in October 2021.





- Related Data


                                Home Medications











 Medication  Instructions  Recorded  Confirmed


 


Atorvastatin [Lipitor] 20 mg PO HS 02/20/22 03/09/22


 


Hydrocodone/Acetaminophen 15 ml PO Q8H PRN 02/20/22 03/09/22





[Hydrocodone/Acetaminophen   





7.5-325/15 Ml]   


 


Omeprazole 40 mg PO DAILY 02/20/22 03/09/22


 


hydroCHLOROthiazide 25 mg PO DAILY 02/20/22 03/09/22


 


Cornell's 5 ml PO Q4H PRN 03/09/22 03/09/22





Solution(Maalox,Benadryl,Lidocaine   





1:1)   


 


Mag Hydrox/Al Hydrox/Simeth 5 ml PO Q4H PRN 03/09/22 03/09/22





[Maalox]   











                                    Allergies











Allergy/AdvReac Type Severity Reaction Status Date / Time


 


No Known Allergies Allergy   Verified 03/09/22 14:19














Review of Systems


ROS Statement: 


Those systems with pertinent positive or pertinent negative responses have been 

documented in the HPI.





ROS Other: All systems not noted in ROS Statement are negative.





Past Medical History


Past Medical History: Cancer, Hyperlipidemia, Hypertension


Additional Past Medical History / Comment(s): chemo- 2/14/2022.  radiation- 

2-.  cx of throat and tounge stage 2.


History of Any Multi-Drug Resistant Organisms: None Reported


Additional Past Surgical History / Comment(s): Arm surgery, has had colonoscopy


Additional Past Anesthesia/Blood Transfusion Reaction / Comment(s): No hx of 

transfusion at this time


Past Psychological History: No Psychological Hx Reported


Smoking Status: Never smoker


Past Alcohol Use History: Unable to Obtain


Past Drug Use History: None Reported





- Past Family History


  ** famioy 


Family Medical History: No Reported History





General Exam


Limitations: no limitations


General appearance: alert, in no apparent distress (She with basin in his lap 

and when trying to speak he does spit clear liquids in to basin)


Head exam: Present: atraumatic, normocephalic, normal inspection


Eye exam: Present: normal appearance, PERRL, EOMI.  Absent: scleral icterus, 

conjunctival injection, periorbital swelling


ENT exam: Present: normal exam, normal oropharynx (Posterior oropharynx with no 

exudates or abscesses visualized.  Patient does have small apthous ulcer to 

inside of left cheek), mucous membranes moist


Neck exam: Present: normal inspection, tenderness (Mild tenderness anterior/left

 sided neck where burn libia is present from radiation with slight skin peeling 

back where radiation was done), full ROM.  Absent: meningismus, lymphadenopathy


Respiratory exam: Present: normal lung sounds bilaterally.  Absent: respiratory 

distress, wheezes, rales, rhonchi, stridor


Cardiovascular Exam: Present: regular rate, normal rhythm, normal heart sounds. 

 Absent: systolic murmur, diastolic murmur, rubs, gallop, clicks


GI/Abdominal exam: Present: soft, normal bowel sounds.  Absent: distended, 

tenderness, guarding, rebound, rigid


Extremities exam: Present: full ROM


Back exam: Present: full ROM.  Absent: CVA tenderness (R), CVA tenderness (L), 

paraspinal tenderness, vertebral tenderness


Neurological exam: Present: alert, oriented X3, CN II-XII intact


Psychiatric exam: Present: normal affect, normal mood


Skin exam: Present: warm, dry, intact, normal color.  Absent: rash





Course


                                   Vital Signs











  03/09/22





  11:05


 


Temperature 97.8 F


 


Pulse Rate 66


 


Respiratory 18





Rate 


 


Blood Pressure 107/73


 


O2 Sat by Pulse 99





Oximetry 














Medical Decision Making





- Medical Decision Making





This 51-year-old male with a past medical history of febrile and tongue cancer 

presents to the emergency department coming from the cancer center to be 

admitted to the hospital for IV antibiotics and fluids.  She states she is now 

done with chemo and radiation and has his last chemo treatment on 02/14/22 and 

states his last radiation treatment was 2 days ago which she has been doing for 

6 weeks. I did speak with Petty Faustin DNP who works for [facility]

 who stated she is currently at the hospital but will call over to the [facility] and place all orders and antibiotics that they were requesting. I did 

speak with FAYE Martino from [facility] physicians who agreed to admit patient to their 

services. Gunner Delgadillo, patient's primary nurse practitioner for his cancer did 

call me and asked I place him on fluids, and acyclovir IV due to the ulcer in 

his mouth and requested that nutrition/dietitian follows up in sees patient for 

possible [place] evaluation. Discussed with patient who agreed to be admitted to 

the hospital for further workup/evaluation and treatment.  Patient will be 

assessed by speech therapy for swallowing evaluation.  Reviewed the case with my

 attending, .





- Lab Data


Result diagrams: 


                                 03/09/22 14:18





                                 03/09/22 14:18


                                   Lab Results











  03/09/22 03/09/22 03/09/22 Range/Units





  14:18 14:18 14:18 


 


WBC  3.0 L    (3.8-10.6)  k/uL


 


RBC  4.29 L    (4.30-5.90)  m/uL


 


Hgb  13.6    (13.0-17.5)  gm/dL


 


Hct  37.9 L    (39.0-53.0)  %


 


MCV  88.5    (80.0-100.0)  fL


 


MCH  31.8    (25.0-35.0)  pg


 


MCHC  36.0    (31.0-37.0)  g/dL


 


RDW  15.7 H    (11.5-15.5)  %


 


Plt Count  201    (150-450)  k/uL


 


MPV  7.1    


 


Neutrophils %  64    %


 


Lymphocytes %  22    %


 


Monocytes %  9    %


 


Eosinophils %  1    %


 


Basophils %  1    %


 


Neutrophils #  1.9    (1.3-7.7)  k/uL


 


Lymphocytes #  0.7 L    (1.0-4.8)  k/uL


 


Monocytes #  0.3    (0-1.0)  k/uL


 


Eosinophils #  0.0    (0-0.7)  k/uL


 


Basophils #  0.0    (0-0.2)  k/uL


 


Sodium   137   (137-145)  mmol/L


 


Potassium   3.5   (3.5-5.1)  mmol/L


 


Chloride   98   ()  mmol/L


 


Carbon Dioxide   29   (22-30)  mmol/L


 


Anion Gap   10   mmol/L


 


BUN   18   (9-20)  mg/dL


 


Creatinine   0.87   (0.66-1.25)  mg/dL


 


Est GFR (CKD-EPI)AfAm   >90   (>60 ml/min/1.73 sqM)  


 


Est GFR (CKD-EPI)NonAf   >90   (>60 ml/min/1.73 sqM)  


 


Glucose   87   (74-99)  mg/dL


 


Plasma Lactic Acid Dami    1.1  (0.7-2.0)  mmol/L


 


Calcium   9.0   (8.4-10.2)  mg/dL


 


Total Bilirubin   1.3   (0.2-1.3)  mg/dL


 


AST   27   (17-59)  U/L


 


ALT   39   (4-49)  U/L


 


Alkaline Phosphatase   91   ()  U/L


 


Total Protein   6.9   (6.3-8.2)  g/dL


 


Albumin   4.0   (3.5-5.0)  g/dL


 


Urine Color     


 


Urine Appearance     (Clear)  


 


Urine pH     (5.0-8.0)  


 


Ur Specific Gravity     (1.001-1.035)  


 


Urine Protein     (Negative)  


 


Urine Glucose (UA)     (Negative)  


 


Urine Ketones     (Negative)  


 


Urine Blood     (Negative)  


 


Urine Nitrite     (Negative)  


 


Urine Bilirubin     (Negative)  


 


Urine Urobilinogen     (<2.0)  mg/dL


 


Ur Leukocyte Esterase     (Negative)  


 


Urine RBC     (0-5)  /hpf


 


Urine WBC     (0-5)  /hpf


 


Ur Squamous Epith Cells     (0-4)  /hpf


 


Amorphous Sediment     (None)  /hpf


 


Urine Mucus     (None)  /hpf














  03/09/22 Range/Units





  15:06 


 


WBC   (3.8-10.6)  k/uL


 


RBC   (4.30-5.90)  m/uL


 


Hgb   (13.0-17.5)  gm/dL


 


Hct   (39.0-53.0)  %


 


MCV   (80.0-100.0)  fL


 


MCH   (25.0-35.0)  pg


 


MCHC   (31.0-37.0)  g/dL


 


RDW   (11.5-15.5)  %


 


Plt Count   (150-450)  k/uL


 


MPV   


 


Neutrophils %   %


 


Lymphocytes %   %


 


Monocytes %   %


 


Eosinophils %   %


 


Basophils %   %


 


Neutrophils #   (1.3-7.7)  k/uL


 


Lymphocytes #   (1.0-4.8)  k/uL


 


Monocytes #   (0-1.0)  k/uL


 


Eosinophils #   (0-0.7)  k/uL


 


Basophils #   (0-0.2)  k/uL


 


Sodium   (137-145)  mmol/L


 


Potassium   (3.5-5.1)  mmol/L


 


Chloride   ()  mmol/L


 


Carbon Dioxide   (22-30)  mmol/L


 


Anion Gap   mmol/L


 


BUN   (9-20)  mg/dL


 


Creatinine   (0.66-1.25)  mg/dL


 


Est GFR (CKD-EPI)AfAm   (>60 ml/min/1.73 sqM)  


 


Est GFR (CKD-EPI)NonAf   (>60 ml/min/1.73 sqM)  


 


Glucose   (74-99)  mg/dL


 


Plasma Lactic Acid Dami   (0.7-2.0)  mmol/L


 


Calcium   (8.4-10.2)  mg/dL


 


Total Bilirubin   (0.2-1.3)  mg/dL


 


AST   (17-59)  U/L


 


ALT   (4-49)  U/L


 


Alkaline Phosphatase   ()  U/L


 


Total Protein   (6.3-8.2)  g/dL


 


Albumin   (3.5-5.0)  g/dL


 


Urine Color  Yellow  


 


Urine Appearance  Cloudy  (Clear)  


 


Urine pH  6.0  (5.0-8.0)  


 


Ur Specific Gravity  1.033  (1.001-1.035)  


 


Urine Protein  1+ H  (Negative)  


 


Urine Glucose (UA)  Negative  (Negative)  


 


Urine Ketones  3+ H  (Negative)  


 


Urine Blood  Negative  (Negative)  


 


Urine Nitrite  Negative  (Negative)  


 


Urine Bilirubin  1+ H  (Negative)  


 


Urine Urobilinogen  6.0  (<2.0)  mg/dL


 


Ur Leukocyte Esterase  Negative  (Negative)  


 


Urine RBC  2  (0-5)  /hpf


 


Urine WBC  9 H  (0-5)  /hpf


 


Ur Squamous Epith Cells  1  (0-4)  /hpf


 


Amorphous Sediment  Rare H  (None)  /hpf


 


Urine Mucus  Many H  (None)  /hpf














Disposition


Clinical Impression: 


 Leucopenia, History of tongue cancer, History of throat cancer, Trouble 

swallowing, Ulcer aphthous oral, Dehydration, Failure to thrive





Disposition: ADMITTED AS IP TO THIS [facility]


Condition: Serious


Referrals: 


Lakesha Diana DO [Primary Care Provider] - 1-2 days

## 2022-03-10 VITALS — DIASTOLIC BLOOD PRESSURE: 67 MMHG | HEART RATE: 52 BPM | SYSTOLIC BLOOD PRESSURE: 112 MMHG | TEMPERATURE: 98.3 F

## 2022-03-10 VITALS — RESPIRATION RATE: 17 BRPM

## 2022-03-10 RX ADMIN — ACETAMINOPHEN SCH: 160 SOLUTION ORAL at 02:13

## 2022-03-10 RX ADMIN — SODIUM CHLORIDE SCH MLS/HR: 900 INJECTION, SOLUTION INTRAVENOUS at 07:55

## 2022-03-10 RX ADMIN — SODIUM CHLORIDE SCH MLS/HR: 900 INJECTION, SOLUTION INTRAVENOUS at 02:13

## 2022-03-10 RX ADMIN — ACETAMINOPHEN SCH ML: 160 SOLUTION ORAL at 07:51

## 2022-03-10 RX ADMIN — POTASSIUM CHLORIDE SCH MLS/HR: 14.9 INJECTION, SOLUTION INTRAVENOUS at 05:35

## 2022-03-10 RX ADMIN — PANTOPRAZOLE SODIUM SCH MG: 40 INJECTION, POWDER, FOR SOLUTION INTRAVENOUS at 11:19

## 2022-03-10 NOTE — P.DS
Providers


Date of admission: 


03/09/22 15:47





Expected date of discharge: 03/10/22


Attending physician: 


Cisco Mclean MD





Consults: 





                                        





03/09/22 15:48


Consult Physician Routine 


   Consulting Provider: Lenard Patel


   Consult Reason/Comments: Throat and tongue cancer, leukopenia, apthous ulcer


   Do you want consulting provider notified?: Yes











Primary care physician: 


Lakesha Diana DO





Hospital Course: 


\


Discharge Diagnosis:





Acute mucositis, radiation-induced oral mucositis





Squamous cell carcinoma of the throat





Dysphasia





Xerostomia





Leukopenia, secondary to current anti-neoplastic medications/treatment





GERD





Hyperlipidemia





Hospital Course: 





Patient is a very pleasant 51-year-old male recently diagnosed with throat 

cancer (squamous cell carcinoma ) and began chemotherapy and radiation 6 weeks 

ago and just completed last radiation treatment.  Patient reports follows with 

oncologist Dr. Patel in office and Dr. Soto for radiation/chemotherapy 

treatments.  He presented to the emergency department secondary to dysphasia.  

Patient reports inability to eat or drink anything in the past 2-3 days after 

completing last radiation treatment on Monday.  Patient reports in addition to 

this he has had persistent spitting up of white/cream-colored sputum, continuous

nausea and has soreness to the inside of his left cheek.  Patient denies having 

any fevers, chills, headache, lightheadedness, dizziness, chest pain, 

palpitations, shortness of breath, dyspnea with exertion, vomiting, abdominal p

ain, or experiencing any numbness/tingling/weakness in his extremities.  In the 

emergency department patient underwent full evaluation.  Labs drawn revealing 

leukopenia with WBC count 3.0 otherwise labs unremarkable.  Urinalysis negative 

for infection.  ER provider spoke with oncology recommending starting patient on

acyclovir and admitting to observation unit under our services.  Patient was 

started on artificial saliva, cool solution containing nystatin, Benadryl, 

lidocaine, and Maalox as well as Hurricaine spray.  Patient reports significant 

improvement overnight.  He is tolerating clear liquid diet and this was advanced

to full liquid/soft diet and patient continued to tolerate.  Patient underwent 

evaluation by speech and language pathologist, dietitian, and oncology.  Patient

now able to maintain/swallow his secretions, tolerate oral intake, and maintain 

airway.  Patient denies having any headache, lightheadedness, dizziness, chest 

pain, palpitations, or shortness of breath.  Patient medically stable for 

discharge at this time and to follow up with PCP in 1-2 days and oncology as 

previously scheduled.  Prescriptions were sent for patient to continue with 

artificial saliva, cold solution, and Hurricaine spray for home medication 

regimen.





Physical exam:





Vital signs reviewed and stable. 


General: Nontoxic, no distress and appears stated age.  


Derm: Skin warm and dry, normal coloration for ethnicity.  Patient has a 

radiation burn to left anterior side of neck.


Head: Atraumatic, normocephalic and symmetric.  


Eyes: EOMs intact, no lid lag, and anicteric sclera


Mouth: no lip lesions, mucus membranes moist, left buccal area erythematous, no 

distinct ulcer or lesion noted on assessment however patient did have 

difficulties keeping mouth open and sticking out tongue due to consistently 

having to spit out secretions.


Cardiovascular: regular rate and rhythm with normal S1S2, no murmur, positive 

posterior tibial pulses bilaterally, and cap refill < 2 seconds.  


Lungs: Respirations even, regular, and unlabored on room air. Lungs CTA 

bilaterally, no rhonchi, no rales, no wheezing, and no accessory muscle usage. 


Abdominal: soft, nontender to palpation, no guarding, no appreciable 

organomegaly


Ext: ROM intact. No gross muscle atrophy, no edema, no contractures


Neuro: Speech clear, face symmetrical and CN II-XII grossly intact with no noted

focal neuro deficits


Psych: Alert and oriented to person, place, time, and situation. Appropriate and

pleasant affect. 








A total of 35 minutes of time were spent preparing this complex discharge 

summary.











Patient Condition at Discharge: Stable





Plan - Discharge Summary


New Discharge Prescriptions: 


New


   Benzocaine Spray [Hurricaine Spray] 1 spray MUCOUS MEM QID PRN #1 dispenser


     PRN Reason: Mouth Irritation





Continue


   Hydrocodone/Acetaminophen [Hydrocodone/Acetaminophen 7.5-325/15 Ml] 15 ml PO 

Q8H PRN


     PRN Reason: Pain


   hydroCHLOROthiazide 25 mg PO DAILY


   Omeprazole 40 mg PO DAILY


   Mag Hydrox/Al Hydrox/Simeth [Maalox] 5 ml PO Q4H PRN


     PRN Reason: Pain


   Atorvastatin [Lipitor] 20 mg PO HS


   Cornell's Solution(Maalox,Benadryl,Lidocaine 1:1) 5 ml PO Q4H PRN


     PRN Reason: Pain


Discharge Medication List





Atorvastatin [Lipitor] 20 mg PO HS 02/20/22 [History]


Hydrocodone/Acetaminophen [Hydrocodone/Acetaminophen 7.5-325/15 Ml] 15 ml PO Q8H

PRN 02/20/22 [History]


Omeprazole 40 mg PO DAILY 02/20/22 [History]


hydroCHLOROthiazide 25 mg PO DAILY 02/20/22 [History]


Cornell's Solution(Maalox,Benadryl,Lidocaine 1:1) 5 ml PO Q4H PRN 03/09/22 

[History]


Mag Hydrox/Al Hydrox/Simeth [Maalox] 5 ml PO Q4H PRN 03/09/22 [History]


Benzocaine Spray [Hurricaine Spray] 1 spray MUCOUS MEM QID PRN #1 dispenser 

03/10/22 [Rx]








Follow up Appointment(s)/Referral(s): 


Lakesha Diana DO [Primary Care Provider] - 03/14/22 9:30 am


Patient Instructions/Handouts:  Benzocaine (By mouth), Dehydration (DC), Oral 

Mucositis (DC), Mouth Cancer (DC)


Activity/Diet/Wound Care/Special Instructions: 





Activity:





As tolerated.  Take breaks as needed.





Diet: 





Heart healthy and carb consistent diet. Avoid salts, or foods with hidden salts 

such as canned or boxed foods and frozen dinners. Extra salt makes your heart 

work harder and traps the fluid in your body for longer.





Special Instructions:  





Take all of your medications as directed and remember to keep all of your 

doctor's appointments and follow-up as needed.  





Thank you for allowing us to participate in your care, it was truly a pleasure 

having you for our patient!!! 





Discharge Disposition: HOME SELF-CARE

## 2022-03-10 NOTE — P.CONS
History of Present Illness





- Reason for Consult


Consult date: 03/10/22


oral carcinoma


Requesting physician: Jeanette Ricks





- Chief Complaint


Dysphagia, odynophagia





- History of Present Illness





Mr. Menjivar is a very pleasant 51-year-old male patient with well controlled, 

medically managed HTN, hyperlipidemia, GERD under the care of Oncologist Dr. Patel who presented with a painless left mid cervical lesion persistent for 

about 2-3 months, progressively enlarging.  He had a CT scan done showing 2 

enlarged cervical lymph nodes, the largest was 3.3 cm.  FNA 10/12/21 was 

positive for squamous cell carcinoma, P 16 positive.  Staging PET scan 11/9/21 

showed uptake in the 2 known cervical lymph nodes and left base of the tongue, 

which was felt to represent the primary tumor site.  No other metastatic 

lesions, no contralateral lymphadenopathy.  Patient was evaluated by Dr. Villalobos, recommendation was for neoadjuvant concurrent chemoradiation.  Patient

is status post 2 cycles of cisplatin, 2/14/22.  He completed radiation 3/7/22.  

He was in ofc yesterday with c/o dysphagia, odynophagia, mild nausea, no 

vomiting, fever.  Appetite is ok, has no taste.  He will occasionally have 

difficulty swallowing his secretions.  No other c/o on a 14 point ROS.  His CBC 

and CMP were ok, he is on clear liquids, IVF, started on IV acyclovir.  














Review of Systems





14 point ROS is neg except as stated HPI   





Past Medical History


Past Medical History: Cancer, Hyperlipidemia, Hypertension


Additional Past Medical History / Comment(s): chemo- 2/14/2022.  radiation- 

2-.  cx of throat and tounge stage 2.


History of Any Multi-Drug Resistant Organisms: None Reported


Additional Past Surgical History / Comment(s): Arm surgery, has had colonoscopy


Additional Past Anesthesia/Blood Transfusion Reaction / Comm: No hx of 

transfusion at this time


Past Psychological History: No Psychological Hx Reported


Smoking Status: Never smoker


Past Alcohol Use History: Unable to Obtain


Past Drug Use History: None Reported





- Past Family History


  ** famioy 


Family Medical History: No Reported History





Medications and Allergies


                                Home Medications











 Medication  Instructions  Recorded  Confirmed  Type


 


Atorvastatin [Lipitor] 20 mg PO HS 02/20/22 03/09/22 History


 


Hydrocodone/Acetaminophen 15 ml PO Q8H PRN 02/20/22 03/09/22 History





[Hydrocodone/Acetaminophen    





7.5-325/15 Ml]    


 


Omeprazole 40 mg PO DAILY 02/20/22 03/09/22 History


 


hydroCHLOROthiazide 25 mg PO DAILY 02/20/22 03/09/22 History


 


Cornell's 5 ml PO Q4H PRN 03/09/22 03/09/22 History





Solution(Maalox,Benadryl,Lidocaine    





1:1)    


 


Mag Hydrox/Al Hydrox/Simeth 5 ml PO Q4H PRN 03/09/22 03/09/22 History





[Maalox]    








                                    Allergies











Allergy/AdvReac Type Severity Reaction Status Date / Time


 


No Known Allergies Allergy   Verified 03/09/22 14:19














Physical Exam


Vitals: 


                                   Vital Signs











  Temp Pulse Pulse Resp BP BP Pulse Ox


 


 03/10/22 07:50  97.9 F   64  17   114/64  96


 


 03/10/22 04:34  98.1 F   70  16   117/64  97


 


 03/09/22 20:00  97.9 F   68  18   136/81  98


 


 03/09/22 18:51  98.5 F  67   16  131/76   93 L


 


 03/09/22 11:05  97.8 F  66   18  107/73   99








                                Intake and Output











 03/09/22 03/10/22 03/10/22





 22:59 06:59 14:59


 


Intake Total  800 


 


Balance  800 


 


Intake:   


 


  Intake, IV Titration  800 





  Amount   


 


    Dextrose 5%-0.45% NaCl 1,  800 





    000 ml @ 100 mls/hr IV .   





    Q10H [facility], [facility] Rx#:[id number]   


 


Other:   


 


  Weight 121.563 kg  














- Constitutional


General appearance: cooperative, no acute distress, obese





- EENT





generalized oral mucosal redness, mucositis


Eyes: anicteric sclerae, EOMI


ENT: hearing grossly normal





- Neck


Neck: no lymphadenopathy





- Respiratory


Respiratory: bilateral: CTA





- Cardiovascular


Rhythm: regular


Heart sounds: normal: S1, S2


Abnormal Heart Sounds: no systolic murmur, no diastolic murmur, no rub, no S3 

Gallop, no S4 Gallop, no click, no other


  ** leg


Peripheral Edema: bilateral: None





- Gastrointestinal


General gastrointestinal: no absent bowel sounds, no decreased bowel sounds, no 

distended, no hepatomegaly, no hyperactive bowel sounds, normal bowel sounds, no

 organomegaly, no rigid, no scaphoid, soft, no splenomegaly, no tenderness, no 

umbilical hernia, no ventral hernia





- Integumentary


Integumentary: normal





- Neurologic


Neurologic: CNII-XII intact





- Musculoskeletal


Musculoskeletal: strength equal bilaterally





- Psychiatric


Psychiatric: A&O x's 3, appropriate affect, intact judgment & insight





Results


CBC & Chem 7: 


                                 03/09/22 14:18





                                 03/09/22 14:18


Labs: 


                  Abnormal Lab Results - Last 24 Hours (Table)











  03/09/22 03/09/22 Range/Units





  14:18 15:06 


 


WBC  3.0 L   (3.8-10.6)  k/uL


 


RBC  4.29 L   (4.30-5.90)  m/uL


 


Hct  37.9 L   (39.0-53.0)  %


 


RDW  15.7 H   (11.5-15.5)  %


 


Lymphocytes #  0.7 L   (1.0-4.8)  k/uL


 


Urine Protein   1+ H  (Negative)  


 


Urine Ketones   3+ H  (Negative)  


 


Urine Bilirubin   1+ H  (Negative)  


 


Urine WBC   9 H  (0-5)  /hpf


 


Amorphous Sediment   Rare H  (None)  /hpf


 


Urine Mucus   Many H  (None)  /hpf














Assessment and Plan


(1) Acute mucositis


Narrative/Plan: 


2/2 chemo/XRT.  IVF, antiviral, clear liquid diet, oral supportive care.  


Current Visit: Yes   Status: Acute   Priority: High   Code(s): K12.30 - ORAL 

MUCOSITIS (ULCERATIVE), UNSPECIFIED   SNOMED Code(s): 778380794


   





(2) Squamous cell cancer of tongue


Narrative/Plan: 


Completed definitive treatment of the same 3/7/22.  Cont f/u as directed by Onc 

and Rad Onc.  


Current Visit: No   Status: Chronic   Priority: Medium   Code(s): C02.9 - 

MALIGNANT NEOPLASM OF TONGUE, UNSPECIFIED   SNOMED Code(s): 163396093


   


Plan: 





Doctor attests:  I performed a history and physical examination of this patient,

 developed impression and plan of care.  Discussed with dictator.  I agree with 

dictators note, documented as a scribe.

## 2022-05-02 ENCOUNTER — HOSPITAL ENCOUNTER (OUTPATIENT)
Dept: HOSPITAL 47 - RADCTMAIN | Age: 52
Discharge: HOME | End: 2022-05-02
Attending: RADIOLOGY
Payer: COMMERCIAL

## 2022-05-02 DIAGNOSIS — Z79.899: ICD-10-CM

## 2022-05-02 DIAGNOSIS — C77.0: ICD-10-CM

## 2022-05-02 DIAGNOSIS — C01: Primary | ICD-10-CM

## 2022-05-02 PROCEDURE — 70470 CT HEAD/BRAIN W/O & W/DYE: CPT

## 2022-05-02 PROCEDURE — 70492 CT SFT TSUE NCK W/O & W/DYE: CPT

## 2022-05-02 NOTE — CT
CT brain without and with contrast

 

HISTORY: CO 1

 

Helical acquisition obtained pre and post administration of 100 cc Isovue-300 IV. Automated exposure 
control for dose reduction, DLP 2400.94 mGycentimeters

 

No comparisons

 

Possible choroidal fissure cyst noted on the left. There is no hemorrhage or hydrocephalus. No abnorm
al enhancement to suggest metastatic disease. Gray white differentiation is maintained. The calvarium
 is intact. Paranasal sinuses and mastoid air cells as visualized are normal. Orbits show symmetric a
ppearance.

 

IMPRESSION: No evident abnormal enhancement to suggest metastatic disease

## 2022-06-03 ENCOUNTER — HOSPITAL ENCOUNTER (OUTPATIENT)
Dept: HOSPITAL 47 - RADPETMAIN | Age: 52
Discharge: HOME | End: 2022-06-03
Attending: RADIOLOGY
Payer: COMMERCIAL

## 2022-06-03 DIAGNOSIS — Z79.899: ICD-10-CM

## 2022-06-03 DIAGNOSIS — C77.0: ICD-10-CM

## 2022-06-03 DIAGNOSIS — C01: Primary | ICD-10-CM

## 2022-06-03 PROCEDURE — 78815 PET IMAGE W/CT SKULL-THIGH: CPT

## 2022-06-04 NOTE — PE
EXAMINATION TYPE: PET CT fusion skull to thigh

 

DATE OF EXAM: 6/3/2022

 

COMPARISON: Prior outside PET/CT November 9, 2021

 

HISTORY: Head and neck cancer progress study. Completed chemotherapy February 14, 2022. History of ne
ck radiation treatment.

 

TECHNIQUE:  Following the intravenous administration of 11.08 mCi of F-18 FDG, whole body images are 
performed from the skull base to the midthigh.  Images are reviewed on the computer in the coronal, a
xial, and sagittal planes.  Reconstructed rotating images are created on independent workstation and 
reviewed on the computer.   A localization and attenuation correction CT is performed in conjunction 
with the PET scan. Dedicated PET/CT imaging of the head and neck is performed. Blood glucose level eq
uals 78.

 

SCAN: Subsequent Scan

 

FINDINGS: 

 

HEAD AND NECK:  No residual areas of abnormal hypermetabolic uptake at base of tongue or residual lef
t neck adenopathy.

 

No new areas of abnormal hypermetabolic uptake.

 

CHEST, MEDIASTINUM, AND HILAR REGION: No new areas of abnormal hypermetabolic uptake.

 

ABDOMEN AND PELVIS: Normal excretion. No new areas of abnormal hypermetabolic uptake.

 

OSSEOUS STRUCTURES: No new areas of abnormal hypermetabolic uptake.

 

OTHER CT: Nasal septal deviation. Mild subcutaneous edema submandibular region presumed posttreatment
 change.

 

Prostate gland upper limits of normal in size. Tiny fat-containing left inguinal hernia. Sigmoid colo
gege diverticulosis redemonstrated. Tiny fat-containing umbilical hernia.

 

IMPRESSION: Complete positive treatment response. No new or residual areas of abnormal hypermetabolic
 uptake.

## 2023-08-25 ENCOUNTER — HOSPITAL ENCOUNTER (OUTPATIENT)
Dept: HOSPITAL 47 - RADCTMAIN | Age: 53
Discharge: HOME | End: 2023-08-25
Attending: INTERNAL MEDICINE
Payer: COMMERCIAL

## 2023-08-25 DIAGNOSIS — C14.0: ICD-10-CM

## 2023-08-25 DIAGNOSIS — J43.9: ICD-10-CM

## 2023-08-25 DIAGNOSIS — B37.0: ICD-10-CM

## 2023-08-25 DIAGNOSIS — K44.9: ICD-10-CM

## 2023-08-25 DIAGNOSIS — Z71.3: ICD-10-CM

## 2023-08-25 DIAGNOSIS — C76.0: Primary | ICD-10-CM

## 2023-08-25 DIAGNOSIS — E78.5: ICD-10-CM

## 2023-08-25 DIAGNOSIS — Z98.890: ICD-10-CM

## 2023-08-25 DIAGNOSIS — R91.1: ICD-10-CM

## 2023-08-25 PROCEDURE — 71260 CT THORAX DX C+: CPT

## 2023-08-25 PROCEDURE — 70491 CT SOFT TISSUE NECK W/DYE: CPT

## 2023-08-27 NOTE — CT
EXAMINATION TYPE: CT neck chest w con

 

DATE OF EXAM: 8/25/2023

 

COMPARISON: 2/24/2023, 6/3/2022

 

HISTORY: 52-year-old male C76.0, tongue and throat ca.

 

TECHNIQUE: Contiguous axial scanning of the soft tissues of the neck and chest performed with IV Cont
rast, patient injected with 100ml mL of Isovue 300. Coronal/sagittal reconstructions performed.

 

CT DLP: 1859.3 mGycm

Automated exposure control for dose reduction was used.

 

FINDINGS: 

 

NECK:

Mild residual thickening along the cervical mucosal space of the hypopharynx similar to slightly impr
peggy from prior. No recurrent mass with particular attention to the posterior tongue and left tonsill
ar pillar.  Mild thickening of the epiglottis likely posttreatment change, similar.

 

Nasopharynx is clear. Gliotic and subglottic structures appear clear.

 

Thyroid gland, submandibular glands are satisfactory. Extensive artifacts likely relating to the krista
ent's arms up limiting assessment of the parotid glands.

 

No obvious cervical lymphadenopathy with particular attention to the left submandibular space/station
 2A at the site of pathologic adenopathy on 12/2/2021.

 

No new cervical lymphadenopathy seen.

 

Bones: Limited due to elevated patient shoulders.

 

CHEST:

Heart normal size without pericardial effusion.

 

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

 

No thoracic lymph adenopathy by CT size criteria.

 

Strandy dependent atelectasis noted. There is a stable 5 mm left mid lung pulmonary nodule. Presently
, the nodule is calcified compatible with a benign calcified granuloma. No new or suspicious pulmonar
y nodules are seen. Minimal emphysematous change.

 

There is a small hiatal hernia. Visualized upper abdomen otherwise shows no gross abnormality.

 

Bones: No osseous destructive process.

 

 

IMPRESSION: 

 

NECK:

1. POSTTREATMENT CHANGE WITH SIMILAR MILD DIFFUSE THICKENING OF THE MUCOSAL SPACE OF THE HYPOPHARYNX.
 NO RECURRENT MASS OR SUSPICIOUS LYMPHADENOPATHY IDENTIFIED. SOME LIMITATION DUE TO PATIENT'S SHOULDE
RS AND ELEVATED ARMS.

 

CHEST:

2. STABLE 5 MM LEFT MID LUNG PULMONARY NODULE. PRESENTLY, THE NODULE IS CALCIFIED COMPATIBLE WITH A B
ENIGN CALCIFIED GRANULOMA.

3. MINIMAL SCATTERED EMPHYSEMATOUS CHANGE NOTED. NO EVIDENCE FOR METASTATIC DISEASE TO THE CHEST.

4. SMALL HIATAL HERNIA.

## 2024-09-13 ENCOUNTER — HOSPITAL ENCOUNTER (OUTPATIENT)
Dept: HOSPITAL 47 - RADCTMAIN | Age: 54
Discharge: HOME | End: 2024-09-13
Attending: RADIOLOGY
Payer: COMMERCIAL

## 2024-09-13 DIAGNOSIS — C77.0: ICD-10-CM

## 2024-09-13 DIAGNOSIS — C01: ICD-10-CM

## 2024-09-13 DIAGNOSIS — Z79.899: ICD-10-CM

## 2024-09-13 DIAGNOSIS — Z85.810: ICD-10-CM

## 2024-09-13 DIAGNOSIS — Z92.3: ICD-10-CM

## 2024-09-13 DIAGNOSIS — Z08: Primary | ICD-10-CM

## 2024-09-13 PROCEDURE — 71260 CT THORAX DX C+: CPT

## 2024-09-13 PROCEDURE — 70491 CT SOFT TISSUE NECK W/DYE: CPT

## 2024-09-13 NOTE — CT
EXAMINATION TYPE: CT neck chest w con

CT DLP: 2975.7 mGycm, Automated exposure control for dose reduction was used.

 

DATE OF EXAM: 9/13/2024 4:46 PM

 

COMPARISON: 8/25/2023.  

 

CLINICAL INDICATION: Male, 53 years old with history of Z08 ENCNTR FOR FOLLOW-UP EXAM AFTER Z85.810,Z
92.3;, Tongue and throat Ca.

 

TECHNIQUE: Standard enhanced CT of the neck and chest.  Axial sections with coronal and sagittal refo
rmats were obtained. 

Contrast used:100 mL of Isovue 370 with IV Contrast, (none if empty)

Oral contrast used: (none if empty)

 

FINDINGS: 

Brain: Visualized portions are grossly unremarkable.

Orbits: Unremarkable 

Sinuses: Grossly unremarkable.

Spaces of the neck: Clear and symmetric. No new or enlarging lymph nodes or masses. The mucosal and p
arapharyngeal space appear symmetric. The larynx is symmetrical.

Musculoskeletal: No acute osseous pathology.

Lymph nodes:  Multiple nonenlarged lymph nodes are seen along both anterior chains of the neck.  

Vascular structures: Visualized major arteries are patent without evidence of aneurysm.

Thoracic Inlet/airway: Airway is patent. The lung apices are clear.

Soft tissues/Thyroid: Thyroid and remainder of the soft tissues are unremarkable.

Other: none.

 

LUNGS/ PLEURA: No focal consolidation, pneumothorax or pleural effusion. Stable left calcified granul
jared.

AIRWAY: Patent and unremarkable.

HEART: Size within normal limits.

MEDIASTINUM: No gross evidence of adenopathy.

VASCULATURE:  No aortic aneurysm. Right subclavian artery with a retroesophageal course.

MUSCULOSKELETAL: No acute osseous abnormalities

SOFT TISSUES/LYMPH NODES: Unremarkable.

LOWER NECK: No significant findings.

UPPER ABDOMEN: No significant findings. 

 

IMPRESSION:

Stable exam without evidence for enlarging mass or lymphadenopathy. The mucosal and neck are rather s
ymmetric.